# Patient Record
Sex: MALE | URBAN - METROPOLITAN AREA
[De-identification: names, ages, dates, MRNs, and addresses within clinical notes are randomized per-mention and may not be internally consistent; named-entity substitution may affect disease eponyms.]

---

## 2024-03-28 ENCOUNTER — OFFICE (OUTPATIENT)
Dept: URBAN - METROPOLITAN AREA CLINIC 86 | Facility: CLINIC | Age: 54
Setting detail: OPHTHALMOLOGY
End: 2024-03-28
Payer: COMMERCIAL

## 2024-03-28 DIAGNOSIS — H52.4: ICD-10-CM

## 2024-03-28 DIAGNOSIS — H25.13: ICD-10-CM

## 2024-03-28 PROCEDURE — 92015 DETERMINE REFRACTIVE STATE: CPT | Performed by: OPHTHALMOLOGY

## 2024-03-28 PROCEDURE — 92004 COMPRE OPH EXAM NEW PT 1/>: CPT | Performed by: OPHTHALMOLOGY

## 2024-03-28 ASSESSMENT — REFRACTION_MANIFEST
OD_AXIS: 020
OS_ADD: +1.50
OS_AXIS: 180
OS_CYLINDER: +1.00
OS_SPHERE: +1.25
OS_VA1: 20/20-
OD_CYLINDER: +0.50
OD_VA1: 20/20-
OD_SPHERE: +1.25
OD_ADD: +1.50

## 2024-03-28 ASSESSMENT — SPHEQUIV_DERIVED
OS_SPHEQUIV: 1.75
OD_SPHEQUIV: 1.5

## 2024-04-19 VITALS
TEMPERATURE: 98 F | HEART RATE: 80 BPM | SYSTOLIC BLOOD PRESSURE: 121 MMHG | DIASTOLIC BLOOD PRESSURE: 83 MMHG | WEIGHT: 202.38 LBS | HEIGHT: 68 IN | OXYGEN SATURATION: 96 % | RESPIRATION RATE: 16 BRPM

## 2024-04-19 RX ORDER — POVIDONE-IODINE 5 %
1 AEROSOL (ML) TOPICAL ONCE
Refills: 0 | Status: COMPLETED | OUTPATIENT
Start: 2024-04-22 | End: 2024-04-22

## 2024-04-19 NOTE — H&P ADULT - PROBLEM SELECTOR PLAN 1
Admit to Orthopaedic Service.  Presents today for elective laminectomy with posterolateral fusion with TLIF and pelvic fixation L3-S1  Pt medically stable and cleared for procedure today by Dr. Freitas

## 2024-04-19 NOTE — H&P ADULT - NSICDXPASTMEDICALHX_GEN_ALL_CORE_FT
PAST MEDICAL HISTORY:  HTN (hypertension)     AGUILA (obstructive sleep apnea)      PAST MEDICAL HISTORY:  Anxiety and depression     HTN (hypertension)     Snoring

## 2024-04-19 NOTE — H&P ADULT - NSHPPHYSICALEXAM_GEN_ALL_CORE
Gen: 54 y/o, NAD  MSK: Decreased lumbar spine ROM secondary to pain      Rest of PE per MD clearance Gen: 54 y/o, NAD  MSK: Decreased lumbar spine ROM secondary to pain  Skin without erythema, ecchymosis, abrasions or lesions, signs of infection  Calves soft, nontender bilaterally. Negative Jassi sign  Sensation intact to light touch left lower extremity. decrease sensation to right lower extremity when compared to left.  Pulses: DP 2+, brisk capillary refill  EHL/FHL/TA/GS 5/5 bilateral lower extremities      Rest of PE per MD clearance

## 2024-04-19 NOTE — H&P ADULT - HISTORY OF PRESENT ILLNESS
53yoM with low back pain x     Presents today for elective laminectomy with posteriolateral fusion with TLIF and pelvic fixation L3-S1 with Dr. Bagley 53yoM with low back pain greater than 1 year. Pt states that this was do to work injuries over the years, but denies a particular instance where he noticed a change. Symptoms have worsened over time and pt has failed conservative measures, including epidural injections. Pt states the pain is worse when walking up stairs and when he is doing chores around the home. The pain is limiting him from doing activities. Pt endorses use of Gasbepentin for pain but only takes it once in w while. he also uses Tylenol once in a while but states that it does not always work. pt admits to numbness to lateral upper thigh, but denies weakness ot tingling. Denies Hx of MI, stroke, seizures, CP, blood thinners, blood clots, n/v.    Presents today for elective laminectomy with posteriolateral fusion with TLIF and pelvic fixation L3-S1 with Dr. Bagley 53yoM with low back pain greater than 1 year. Pt states that this was do to work injuries over the years, but denies a particular instance where he noticed a change. Symptoms have worsened over time and pt has failed conservative measures, including epidural injections. Pt states the pain is worse when walking up stairs and when he is doing chores around the home. The pain is limiting him from doing activities. Pt endorses use of Gabapentin for pain but only takes it once in a while. He also uses Tylenol once in a while but states that it does not always work. pt admits to numbness to lateral upper thigh, but denies weakness ot tingling. Denies Hx of MI, stroke, seizures, CP, blood thinners, blood clots, n/v.    Presents today for elective laminectomy with posteriolateral fusion with TLIF and pelvic fixation L3-S1 with Dr. Bagley

## 2024-04-19 NOTE — PATIENT PROFILE ADULT - OVER THE PAST TWO WEEKS HAVE YOU FELT DOWN, DEPRESSED OR HOPELESS?
HR=87 bpm, YERM=705/71 mmhg, SpO2=99.0 %, Resp=10 B/min, EtCO2=35 mmHg, Pain=0, Shaw=2, Comment=sr no

## 2024-04-19 NOTE — PATIENT PROFILE ADULT - FALL HARM RISK - HARM RISK INTERVENTIONS

## 2024-04-19 NOTE — H&P ADULT - NSHPLABSRESULTS_GEN_ALL_CORE
Preop CBC, BMP, PT/INR, PTT within normal range and reviewed per medical clearance  H/H: 15.1/44.8  Cr: 1.1  UA: within normal limits and reviewed per medical clearance  Preop EKG within normal limits and reviewed per medical clearance  3M: DOS  T + S: DOS

## 2024-04-22 ENCOUNTER — INPATIENT (INPATIENT)
Facility: HOSPITAL | Age: 54
LOS: 3 days | Discharge: HOME | DRG: 455 | End: 2024-04-26
Attending: ORTHOPAEDIC SURGERY | Admitting: ORTHOPAEDIC SURGERY
Payer: COMMERCIAL

## 2024-04-22 DIAGNOSIS — I10 ESSENTIAL (PRIMARY) HYPERTENSION: ICD-10-CM

## 2024-04-22 DIAGNOSIS — Z98.890 OTHER SPECIFIED POSTPROCEDURAL STATES: Chronic | ICD-10-CM

## 2024-04-22 DIAGNOSIS — M48.061 SPINAL STENOSIS, LUMBAR REGION WITHOUT NEUROGENIC CLAUDICATION: ICD-10-CM

## 2024-04-22 DIAGNOSIS — G47.33 OBSTRUCTIVE SLEEP APNEA (ADULT) (PEDIATRIC): ICD-10-CM

## 2024-04-22 LAB
BLD GP AB SCN SERPL QL: NEGATIVE — SIGNIFICANT CHANGE UP
RH IG SCN BLD-IMP: POSITIVE — SIGNIFICANT CHANGE UP

## 2024-04-22 DEVICE — GRAFT BONE INFUSE KIT MED: Type: IMPLANTABLE DEVICE | Status: FUNCTIONAL

## 2024-04-22 DEVICE — SURGIFOAM PAD 8CM X 12.5CM X 10MM (100): Type: IMPLANTABLE DEVICE | Status: FUNCTIONAL

## 2024-04-22 DEVICE — CONN SFX TITAN MED SZ A6 CROSS: Type: IMPLANTABLE DEVICE | Status: FUNCTIONAL

## 2024-04-22 DEVICE — SURGIFLO HEMOSTATIC MATRIX KIT: Type: IMPLANTABLE DEVICE | Status: FUNCTIONAL

## 2024-04-22 DEVICE — ROD PRE-LORDOSED W/LINE 40MM: Type: IMPLANTABLE DEVICE | Status: FUNCTIONAL

## 2024-04-22 DEVICE — SCREW SET: Type: IMPLANTABLE DEVICE | Status: FUNCTIONAL

## 2024-04-22 DEVICE — IMPLANTABLE DEVICE: Type: IMPLANTABLE DEVICE | Status: FUNCTIONAL

## 2024-04-22 DEVICE — SCREW POLYAXIAL 6.0X50MM: Type: IMPLANTABLE DEVICE | Status: FUNCTIONAL

## 2024-04-22 RX ORDER — CHLORHEXIDINE GLUCONATE 213 G/1000ML
1 SOLUTION TOPICAL ONCE
Refills: 0 | Status: COMPLETED | OUTPATIENT
Start: 2024-04-22 | End: 2024-04-22

## 2024-04-22 RX ORDER — AMLODIPINE BESYLATE 2.5 MG/1
10 TABLET ORAL DAILY
Refills: 0 | Status: DISCONTINUED | OUTPATIENT
Start: 2024-04-22 | End: 2024-04-22

## 2024-04-22 RX ORDER — AMLODIPINE BESYLATE 2.5 MG/1
1 TABLET ORAL
Refills: 0 | DISCHARGE

## 2024-04-22 RX ORDER — APREPITANT 80 MG/1
40 CAPSULE ORAL ONCE
Refills: 0 | Status: COMPLETED | OUTPATIENT
Start: 2024-04-22 | End: 2024-04-22

## 2024-04-22 RX ORDER — OXYCODONE HYDROCHLORIDE 5 MG/1
5 TABLET ORAL EVERY 6 HOURS
Refills: 0 | Status: DISCONTINUED | OUTPATIENT
Start: 2024-04-22 | End: 2024-04-24

## 2024-04-22 RX ORDER — AMLODIPINE BESYLATE 2.5 MG/1
10 TABLET ORAL DAILY
Refills: 0 | Status: DISCONTINUED | OUTPATIENT
Start: 2024-04-22 | End: 2024-04-26

## 2024-04-22 RX ORDER — SODIUM CHLORIDE 9 MG/ML
1000 INJECTION, SOLUTION INTRAVENOUS
Refills: 0 | Status: DISCONTINUED | OUTPATIENT
Start: 2024-04-22 | End: 2024-04-23

## 2024-04-22 RX ORDER — SENNA PLUS 8.6 MG/1
2 TABLET ORAL AT BEDTIME
Refills: 0 | Status: DISCONTINUED | OUTPATIENT
Start: 2024-04-22 | End: 2024-04-26

## 2024-04-22 RX ORDER — ACETAMINOPHEN 500 MG
1000 TABLET ORAL EVERY 8 HOURS
Refills: 0 | Status: DISCONTINUED | OUTPATIENT
Start: 2024-04-22 | End: 2024-04-26

## 2024-04-22 RX ORDER — ACETAMINOPHEN 500 MG
1000 TABLET ORAL ONCE
Refills: 0 | Status: COMPLETED | OUTPATIENT
Start: 2024-04-22 | End: 2024-04-22

## 2024-04-22 RX ORDER — BUPROPION HYDROCHLORIDE 150 MG/1
300 TABLET, EXTENDED RELEASE ORAL DAILY
Refills: 0 | Status: DISCONTINUED | OUTPATIENT
Start: 2024-04-22 | End: 2024-04-26

## 2024-04-22 RX ORDER — HYDROMORPHONE HYDROCHLORIDE 2 MG/ML
0.5 INJECTION INTRAMUSCULAR; INTRAVENOUS; SUBCUTANEOUS
Refills: 0 | Status: DISCONTINUED | OUTPATIENT
Start: 2024-04-22 | End: 2024-04-26

## 2024-04-22 RX ORDER — PANTOPRAZOLE SODIUM 20 MG/1
40 TABLET, DELAYED RELEASE ORAL
Refills: 0 | Status: DISCONTINUED | OUTPATIENT
Start: 2024-04-22 | End: 2024-04-26

## 2024-04-22 RX ORDER — CEFAZOLIN SODIUM 1 G
2000 VIAL (EA) INJECTION EVERY 8 HOURS
Refills: 0 | Status: COMPLETED | OUTPATIENT
Start: 2024-04-22 | End: 2024-04-23

## 2024-04-22 RX ORDER — SODIUM CHLORIDE 9 MG/ML
1000 INJECTION, SOLUTION INTRAVENOUS ONCE
Refills: 0 | Status: COMPLETED | OUTPATIENT
Start: 2024-04-22 | End: 2024-04-22

## 2024-04-22 RX ORDER — ONDANSETRON 8 MG/1
4 TABLET, FILM COATED ORAL EVERY 4 HOURS
Refills: 0 | Status: DISCONTINUED | OUTPATIENT
Start: 2024-04-22 | End: 2024-04-26

## 2024-04-22 RX ORDER — POLYETHYLENE GLYCOL 3350 17 G/17G
17 POWDER, FOR SOLUTION ORAL
Refills: 0 | Status: DISCONTINUED | OUTPATIENT
Start: 2024-04-22 | End: 2024-04-26

## 2024-04-22 RX ORDER — HYDROMORPHONE HYDROCHLORIDE 2 MG/ML
0.5 INJECTION INTRAMUSCULAR; INTRAVENOUS; SUBCUTANEOUS EVERY 4 HOURS
Refills: 0 | Status: DISCONTINUED | OUTPATIENT
Start: 2024-04-22 | End: 2024-04-26

## 2024-04-22 RX ORDER — SODIUM CHLORIDE 9 MG/ML
1000 INJECTION, SOLUTION INTRAVENOUS
Refills: 0 | Status: DISCONTINUED | OUTPATIENT
Start: 2024-04-22 | End: 2024-04-22

## 2024-04-22 RX ADMIN — Medication 1000 MILLIGRAM(S): at 22:06

## 2024-04-22 RX ADMIN — Medication 1 APPLICATION(S): at 10:35

## 2024-04-22 RX ADMIN — Medication 1000 MILLIGRAM(S): at 22:51

## 2024-04-22 RX ADMIN — APREPITANT 40 MILLIGRAM(S): 80 CAPSULE ORAL at 10:30

## 2024-04-22 RX ADMIN — CHLORHEXIDINE GLUCONATE 1 APPLICATION(S): 213 SOLUTION TOPICAL at 10:35

## 2024-04-22 RX ADMIN — Medication 100 MILLIGRAM(S): at 19:19

## 2024-04-22 RX ADMIN — SENNA PLUS 2 TABLET(S): 8.6 TABLET ORAL at 22:07

## 2024-04-22 RX ADMIN — Medication 1000 MILLIGRAM(S): at 10:30

## 2024-04-22 RX ADMIN — SODIUM CHLORIDE 1000 MILLILITER(S): 9 INJECTION, SOLUTION INTRAVENOUS at 16:15

## 2024-04-22 NOTE — BRIEF OPERATIVE NOTE - NSICDXBRIEFPROCEDURE_GEN_ALL_CORE_FT
PROCEDURES:  Fusion, spine, lumbar, TLIF, 3 or more levels 22-Apr-2024 11:41:33  Mckinley Ryan   PROCEDURES:  Fusion, spine, lumbar, TLIF, 1-2 levels 22-Apr-2024 15:17:39  cMkinley Ryan

## 2024-04-22 NOTE — PROGRESS NOTE ADULT - SUBJECTIVE AND OBJECTIVE BOX
Ortho Post Op Check    Procedure:  Surgeon:    Pt comfortable without complaints, pain controlled  Denies CP, SOB, N/V, numbness/tingling     Vital Signs Last 24 Hrs  T(C): 36.6 (04-22-24 @ 18:26), Max: 36.6 (04-22-24 @ 18:26)  T(F): 97.8 (04-22-24 @ 18:26), Max: 97.8 (04-22-24 @ 18:26)  HR: 72 (04-22-24 @ 18:26) (62 - 72)  BP: 110/68 (04-22-24 @ 18:26) (93/51 - 110/68)  BP(mean): 77 (04-22-24 @ 17:30) (65 - 77)  RR: 18 (04-22-24 @ 18:26) (10 - 20)  SpO2: 96% (04-22-24 @ 18:26) (91% - 96%)  I&O's Summary    22 Apr 2024 07:01  -  22 Apr 2024 21:35  --------------------------------------------------------  IN: 1200 mL / OUT: 335 mL / NET: 865 mL        General: Pt Alert and oriented, NAD  DSG C/D/I paper tape, gauze, lang  RUE  Sensation: SILT C5-T1   Motor:   C5 (deltoid abduction) 5/5   C6 (biceps flexion)  5/5   C7 (triceps extension) 5/5   C8 (finger flexion)  5/5   T1 (interosseous)  5/5    LUE  Sensation: SILT C5-T1  Motor:   C5 (deltoid abduction) 5/5   C6 (biceps flexion)  5/5   C7 (triceps extension) 5/5   C8 (finger flexion)  5/5   T1 (interosseous)  5/5     RLE   Sensation: SILT L2  Motor: L3-L5  L2 (hip flexion)  4/5   L3 (knee extension)  4/5   L4 (ankle dorsiflexion)  5/5   L5 (long toe extension) 5/5   S1 (ankle plantar flexion) 5/5      LLE  Sensation: SILT L2-S1  Motor:   L2 (hip flexion)  5/5   L3 (knee extension)  5/5   L4 (ankle dorsiflexion)  5/5   L5 (long toe extension) 5/5   S1 (ankle plantar flexion) 5/5  Reflexes: Normal and symmetric  Negative clonus. Down-going Babinski        A/P: 53yMale POD#0 s/p L3/L4 TLIF  - Stable  - Pain Control  - DVT ppx: scds  - Post op abx: ancef  - PT, WBS:  wbat    Ortho Pager [phone]

## 2024-04-23 LAB
ANION GAP SERPL CALC-SCNC: 8 MMOL/L — SIGNIFICANT CHANGE UP (ref 5–17)
BASOPHILS # BLD AUTO: 0.03 K/UL — SIGNIFICANT CHANGE UP (ref 0–0.2)
BASOPHILS NFR BLD AUTO: 0.2 % — SIGNIFICANT CHANGE UP (ref 0–2)
BUN SERPL-MCNC: 11 MG/DL — SIGNIFICANT CHANGE UP (ref 7–23)
CALCIUM SERPL-MCNC: 9.1 MG/DL — SIGNIFICANT CHANGE UP (ref 8.4–10.5)
CHLORIDE SERPL-SCNC: 103 MMOL/L — SIGNIFICANT CHANGE UP (ref 96–108)
CO2 SERPL-SCNC: 25 MMOL/L — SIGNIFICANT CHANGE UP (ref 22–31)
CREAT SERPL-MCNC: 0.85 MG/DL — SIGNIFICANT CHANGE UP (ref 0.5–1.3)
EGFR: 105 ML/MIN/1.73M2 — SIGNIFICANT CHANGE UP
EOSINOPHIL # BLD AUTO: 0.01 K/UL — SIGNIFICANT CHANGE UP (ref 0–0.5)
EOSINOPHIL NFR BLD AUTO: 0.1 % — SIGNIFICANT CHANGE UP (ref 0–6)
GLUCOSE SERPL-MCNC: 156 MG/DL — HIGH (ref 70–99)
HCT VFR BLD CALC: 34.5 % — LOW (ref 39–50)
HGB BLD-MCNC: 11.2 G/DL — LOW (ref 13–17)
IMM GRANULOCYTES NFR BLD AUTO: 0.6 % — SIGNIFICANT CHANGE UP (ref 0–0.9)
LYMPHOCYTES # BLD AUTO: 0.99 K/UL — LOW (ref 1–3.3)
LYMPHOCYTES # BLD AUTO: 7.5 % — LOW (ref 13–44)
MCHC RBC-ENTMCNC: 29.1 PG — SIGNIFICANT CHANGE UP (ref 27–34)
MCHC RBC-ENTMCNC: 32.5 GM/DL — SIGNIFICANT CHANGE UP (ref 32–36)
MCV RBC AUTO: 89.6 FL — SIGNIFICANT CHANGE UP (ref 80–100)
MONOCYTES # BLD AUTO: 1.15 K/UL — HIGH (ref 0–0.9)
MONOCYTES NFR BLD AUTO: 8.7 % — SIGNIFICANT CHANGE UP (ref 2–14)
NEUTROPHILS # BLD AUTO: 10.97 K/UL — HIGH (ref 1.8–7.4)
NEUTROPHILS NFR BLD AUTO: 82.9 % — HIGH (ref 43–77)
NRBC # BLD: 0 /100 WBCS — SIGNIFICANT CHANGE UP (ref 0–0)
PLATELET # BLD AUTO: 241 K/UL — SIGNIFICANT CHANGE UP (ref 150–400)
POTASSIUM SERPL-MCNC: 4.1 MMOL/L — SIGNIFICANT CHANGE UP (ref 3.5–5.3)
POTASSIUM SERPL-SCNC: 4.1 MMOL/L — SIGNIFICANT CHANGE UP (ref 3.5–5.3)
RBC # BLD: 3.85 M/UL — LOW (ref 4.2–5.8)
RBC # FLD: 13.6 % — SIGNIFICANT CHANGE UP (ref 10.3–14.5)
SODIUM SERPL-SCNC: 136 MMOL/L — SIGNIFICANT CHANGE UP (ref 135–145)
WBC # BLD: 13.23 K/UL — HIGH (ref 3.8–10.5)
WBC # FLD AUTO: 13.23 K/UL — HIGH (ref 3.8–10.5)

## 2024-04-23 PROCEDURE — 99406 BEHAV CHNG SMOKING 3-10 MIN: CPT

## 2024-04-23 PROCEDURE — 99223 1ST HOSP IP/OBS HIGH 75: CPT

## 2024-04-23 RX ADMIN — POLYETHYLENE GLYCOL 3350 17 GRAM(S): 17 POWDER, FOR SOLUTION ORAL at 18:25

## 2024-04-23 RX ADMIN — Medication 1000 MILLIGRAM(S): at 13:38

## 2024-04-23 RX ADMIN — OXYCODONE HYDROCHLORIDE 5 MILLIGRAM(S): 5 TABLET ORAL at 21:28

## 2024-04-23 RX ADMIN — POLYETHYLENE GLYCOL 3350 17 GRAM(S): 17 POWDER, FOR SOLUTION ORAL at 06:30

## 2024-04-23 RX ADMIN — OXYCODONE HYDROCHLORIDE 5 MILLIGRAM(S): 5 TABLET ORAL at 13:38

## 2024-04-23 RX ADMIN — SODIUM CHLORIDE 100 MILLILITER(S): 9 INJECTION, SOLUTION INTRAVENOUS at 03:18

## 2024-04-23 RX ADMIN — OXYCODONE HYDROCHLORIDE 5 MILLIGRAM(S): 5 TABLET ORAL at 20:28

## 2024-04-23 RX ADMIN — BUPROPION HYDROCHLORIDE 300 MILLIGRAM(S): 150 TABLET, EXTENDED RELEASE ORAL at 11:09

## 2024-04-23 RX ADMIN — Medication 1000 MILLIGRAM(S): at 22:16

## 2024-04-23 RX ADMIN — OXYCODONE HYDROCHLORIDE 5 MILLIGRAM(S): 5 TABLET ORAL at 14:38

## 2024-04-23 RX ADMIN — Medication 1000 MILLIGRAM(S): at 06:30

## 2024-04-23 RX ADMIN — Medication 1000 MILLIGRAM(S): at 14:38

## 2024-04-23 RX ADMIN — PANTOPRAZOLE SODIUM 40 MILLIGRAM(S): 20 TABLET, DELAYED RELEASE ORAL at 06:31

## 2024-04-23 RX ADMIN — Medication 1000 MILLIGRAM(S): at 07:15

## 2024-04-23 RX ADMIN — Medication 100 MILLIGRAM(S): at 03:37

## 2024-04-23 RX ADMIN — Medication 50 MILLIGRAM(S): at 11:09

## 2024-04-23 NOTE — PHYSICAL THERAPY INITIAL EVALUATION ADULT - GENERAL OBSERVATIONS, REHAB EVAL
Patient received semi-supine in NAD with +ECG +heplock +hemovac x 2 +lang +spinal dressing clean/dry/intact

## 2024-04-23 NOTE — PROGRESS NOTE ADULT - SUBJECTIVE AND OBJECTIVE BOX
Ortho Note    Pt comfortable without complaints, pain controlled. Pre-op thigh numbness resolved.   Denies CP, SOB, N/V, numbness/tingling     Vital Signs Last 24 Hrs  T(C): --  T(F): --  HR: 64 (04-23-24 @ 05:35) (64 - 71)  BP: 106/64 (04-23-24 @ 05:35) (106/64 - 108/57)  BP(mean): 77 (04-23-24 @ 05:35) (76 - 77)  RR: 18 (04-23-24 @ 05:35) (16 - 18)  SpO2: 98% (04-23-24 @ 05:35) (96% - 98%)  I&O's Summary    22 Apr 2024 07:01  -  23 Apr 2024 06:39  --------------------------------------------------------  IN: 2300 mL / OUT: 1085 mL / NET: 1215 mL        General: Pt Alert and oriented, NAD  Gauze paper tape DSG C/D/I  HV x2  Pulses: 2+ DP/PT b/l  Sensation: SILT b/l, pre-op thigh numbness resolved  Motor: Quad/Ham/EHL/FHL/TA/GS  5/5          A/P: 53yMale POD#1 s/p L3-4 TLIF  - Stable  - Pain Control  - DVT ppx: SCDs  - PT, WBS: WBAT  - Dispo: PT Eval    Ortho Pager [phone]

## 2024-04-23 NOTE — DISCHARGE NOTE PROVIDER - NSDCMRMEDTOKEN_GEN_ALL_CORE_FT
amLODIPine 10 mg oral tablet: 1 tab(s) orally once a day  BuPROPion (Eqv-Zyban Advantage Pack) 150 mg/12 hours oral tablet, extended release: 1 tab(s) orally 2 times a day  gabapentin 300 mg oral capsule: 1 cap(s) orally prn   acetaminophen 500 mg oral tablet: 2 tab(s) orally every 8 hours  amLODIPine 10 mg oral tablet: 1 tab(s) orally once a day  buPROPion 300 mg/24 hours (XL) oral tablet, extended release: 1 tab(s) orally once a day  oxyCODONE 5 mg oral tablet: 1 tab(s) orally every 6 hours as needed for Moderate Pain (4 - 6) MDD: 4  pantoprazole 40 mg oral delayed release tablet: 1 tab(s) orally once a day (before a meal)  pregabalin 50 mg oral capsule: 1 cap(s) orally once a day MDD: 1  senna leaf extract oral tablet: 2 tab(s) orally once a day (at bedtime)

## 2024-04-23 NOTE — PHYSICAL THERAPY INITIAL EVALUATION ADULT - DIAGNOSIS, PT EVAL
4I: impaired joint mobility, motor function, muscle performance, and range of motion associated with bony or soft tissue surgery The patient has been re-examined and I agree with the above assessment or I updated with my findings.

## 2024-04-23 NOTE — PROGRESS NOTE ADULT - SUBJECTIVE AND OBJECTIVE BOX
Ortho Note    Surgery: s/p laminectomy, facetectomy, L3-4 TLIF POD #1    Patient seen and examined at bedside this AM   Pt comfortable without complaints, pain controlled  Denies CP, SOB, N/V, numbness/tingling     Vital Signs Last 24 Hrs  T(C): 36.7 (04-23-24 @ 09:06), Max: 36.7 (04-23-24 @ 09:06)  T(F): 98.1 (04-23-24 @ 09:06), Max: 98.1 (04-23-24 @ 09:06)  HR: 75 (04-23-24 @ 10:06) (75 - 80)  BP: 117/74 (04-23-24 @ 09:06) (117/74 - 117/74)  BP(mean): --  RR: 16 (04-23-24 @ 10:06) (13 - 16)  SpO2: 93% (04-23-24 @ 10:06) (93% - 93%)      General: Pt Alert and oriented, NAD  Dressing C/D/I: Abd and paper tape C/D/I at the lumbar spine  HV x2  Lang in place   Pulses: 2+ DP pulses bilateral LE   Sensation: intact to light touch bilateral LE   Motor: quad/ EHL/FHL/TA/GS 5/5 bilaterally         A/P: 53yMale POD#1 s/p laminectomy, facetectomy, L3-4 TLIF     - Stable: Labs and vitals stable. Patient on bipap o/n for untx AGUILA  - discontinue lang   - Pain Control: c/w PO and IV PRN   - DVT ppx: SCDs  - monitor drain outputs  - PT, WBS: WBAT  - Dispo: home pending progress with drains, PT     Ortho Pager [phone]

## 2024-04-23 NOTE — PHYSICAL THERAPY INITIAL EVALUATION ADULT - ADDITIONAL COMMENTS
Patient reports he lives in private home with 5 HELEN. PTA patient ambulated with 1 axillary crutch. Patient has tub shower

## 2024-04-23 NOTE — PHYSICAL THERAPY INITIAL EVALUATION ADULT - PERTINENT HX OF CURRENT PROBLEM, REHAB EVAL
Patient is a 53 year old male with low back pain greater than 1 year. Pt states that this was do to work injuries over the years, but denies a particular instance where he noticed a change. Symptoms have worsened over time and pt has failed conservative measures, including epidural injections. Pt states the pain is worse when walking up stairs and when he is doing chores around the home. The pain is limiting him from doing activities. Pt endorses use of Gabapentin for pain but only takes it once in a while. He also uses Tylenol once in a while but states that it does not always work. pt admits to numbness to lateral upper thigh, but denies weakness ot tingling.

## 2024-04-23 NOTE — CONSULT NOTE ADULT - SUBJECTIVE AND OBJECTIVE BOX
SURGERY CONSULT  ==============================================================================================================  HPI: 53y Male  HPI:  53yoM with low back pain greater than 1 year. Pt states that this was do to work injuries over the years, but denies a particular instance where he noticed a change. Symptoms have worsened over time and pt has failed conservative measures, including epidural injections. Pt states the pain is worse when walking up stairs and when he is doing chores around the home. The pain is limiting him from doing activities. Pt endorses use of Gabapentin for pain but only takes it once in a while. He also uses Tylenol once in a while but states that it does not always work. pt admits to numbness to lateral upper thigh, but denies weakness ot tingling. Denies Hx of MI, stroke, seizures, CP, blood thinners, blood clots, n/v.    Presents today for elective laminectomy with posteriolateral fusion with TLIF and pelvic fixation L3-S1 with Dr. Bagley (19 Apr 2024 12:04)    53M w HTN, Depression, obesity, chronic low back pain, Tobacco use d/o, here for elective L3-S1 TLIF and Postero-lateral fusion w Dr. To, found by anesthesia to have high likelihood for AGUILA, for HPT    Pt reports pain is controlled in his back. Denies any numbness. Walking wo LH/dizziness, chest pain, dyspnea. Eating wo N/V/abd pain. +flatus. Voiding no BM. No fevers.     ROS: 12 point ROS reviewed and otherwise negative per HPI  FH: No DVT/PE   SH: Smokes - 1ppd - last cigarette AM of surgery.     PAST MEDICAL & SURGICAL HISTORY:  HTN (hypertension)      Snoring      Anxiety and depression      History of hydrocelectomy      S/P right knee surgery        Home Meds: Home Medications:  amLODIPine 10 mg oral tablet: 1 tab(s) orally once a day (22 Apr 2024 10:08)  BuPROPion (Eqv-Zyban Advantage Pack) 150 mg/12 hours oral tablet, extended release: 1 tab(s) orally 2 times a day (22 Apr 2024 10:08)  gabapentin 300 mg oral capsule: 1 cap(s) orally prn (22 Apr 2024 10:08)    Allergies: Allergies    No Known Allergies    Intolerances      Soc:   Advanced Directives: Presumed Full Code     CURRENT MEDICATIONS:   --------------------------------------------------------------------------------------  Neurologic Medications  acetaminophen     Tablet .. 1000 milliGRAM(s) Oral every 8 hours  buPROPion XL (24-Hour) . 300 milliGRAM(s) Oral daily  HYDROmorphone  Injectable 0.5 milliGRAM(s) IV Push every 15 minutes PRN breakthrough pain in the PACU  HYDROmorphone  Injectable 0.5 milliGRAM(s) IV Push every 4 hours PRN breakthrough pain  ondansetron Injectable 4 milliGRAM(s) IV Push every 4 hours PRN Nausea and/or Vomiting  oxyCODONE    IR 5 milliGRAM(s) Oral every 6 hours PRN Moderate Pain (4 - 6)  pregabalin 50 milliGRAM(s) Oral daily    Respiratory Medications    Cardiovascular Medications  amLODIPine   Tablet 10 milliGRAM(s) Oral daily    Gastrointestinal Medications  pantoprazole    Tablet 40 milliGRAM(s) Oral before breakfast  polyethylene glycol 3350 17 Gram(s) Oral two times a day  senna 2 Tablet(s) Oral at bedtime    Genitourinary Medications    Hematologic/Oncologic Medications    Antimicrobial/Immunologic Medications    Endocrine/Metabolic Medications    Topical/Other Medications    --------------------------------------------------------------------------------------    VITAL SIGNS, INS/OUTS (last 24 hours):  --------------------------------------------------------------------------------------  ICU Vital Signs Last 24 Hrs  T(C): 36.7 (24 Apr 2024 04:52), Max: 37.1 (23 Apr 2024 13:27)  T(F): 98 (24 Apr 2024 04:52), Max: 98.7 (23 Apr 2024 13:27)  HR: 71 (24 Apr 2024 06:54) (71 - 100)  BP: 121/71 (24 Apr 2024 04:52) (114/74 - 125/76)  BP(mean): --  ABP: --  ABP(mean): --  RR: 18 (24 Apr 2024 06:54) (13 - 18)  SpO2: 92% (24 Apr 2024 06:54) (91% - 96%)    O2 Parameters below as of 24 Apr 2024 06:54  Patient On (Oxygen Delivery Method): room air          I&O's Summary    23 Apr 2024 07:01  -  24 Apr 2024 07:00  --------------------------------------------------------  IN: 1540 mL / OUT: 2440 mL / NET: -900 mL      --------------------------------------------------------------------------------------    EXAM:  GEN: Male in NAD on RA  HEENT: NC/AT, MMM  CV: RRR, nml S1S2, no murmurs  PULM: nml effort, CTAB  ABD: Soft, non-distended, NABS, non-tender  NEURO  A/O x3, moving all extremities, Sensation intact  Lumbar dressing c/d/i. 5/5 in BLE.  PSYCH: Appropriate      LABS  --------------------------------------------------------------------------------------  Labs:  CAPILLARY BLOOD GLUCOSE                              10.6   9.18  )-----------( 208      ( 24 Apr 2024 05:30 )             32.4         04-24    139  |  105  |  14  ----------------------------<  92  3.8   |  26  |  0.91      Calcium: 8.7 mg/dL (04-24-24 @ 05:30)      LFTs:         Coags:            Urinalysis Basic - ( 24 Apr 2024 05:30 )    Color: x / Appearance: x / SG: x / pH: x  Gluc: 92 mg/dL / Ketone: x  / Bili: x / Urobili: x   Blood: x / Protein: x / Nitrite: x   Leuk Esterase: x / RBC: x / WBC x   Sq Epi: x / Non Sq Epi: x / Bacteria: x          --------------------------------------------------------------------------------------    OTHER LABS    IMAGING RESULTS  ****************

## 2024-04-23 NOTE — DISCHARGE NOTE PROVIDER - NSDCFUADDINST_GEN_ALL_CORE_FT
ACTIVITY:  Weight bear as tolerated with assistive device. No strenuous activity (bending/twisting), heavy lifting, driving or returning to work until cleared by MD.    DRESSING:   You may shower, Do not soak in bathtubs. Change dressing daily with gauze/tape as needed until post-op day #5, then remove leave incision open to air.  Keep your incision clean and dry. Do not pick at your incision. Do not apply creams, ointments or oils to your incision until cleared by your surgeon. Do not soak your incision in sitting water (ie tubs, pools, lakes, etc.) until cleared by your surgeon. You may let clean, running water fall over your incision.    MEDICATION:  You have been prescribed medications for pain:  Tylenol for mild to moderate pain. Do not exceed 3,000mg daily.  For more severe pain, take Tylenol with the addition of narcotic pain medication. Take this medication as prescribed. This medication may cause drowsiness or dizziness. Do not operate machinery. This medication may cause constipation.  For any additional medications, follow instructions on the bottle.   Try to have regular bowel movements. Take stool softener or laxative if necessary. You may wish to take Miralax daily until you have regular bowel movements.   May take pepcid or zantac for upset stomach.  If you have a pain management physician, please follow-up with them postoperatively. If you experience any negative side effects of your medications, please call your surgeon's office to discuss.    Follow-up: Call to schedule an appt with Dr. To for follow up, if you have staples or sutures they will be removed in office. Contact your doctor if you experience: fever greater than 101.5, chills, chest pain, difficulty breathing, redness or excessive drainage around the incision.

## 2024-04-23 NOTE — DISCHARGE NOTE PROVIDER - CARE PROVIDER_API CALL
Humberto To  Orthopaedic Surgery  [address]  [place] [address]  Phone: [phone]  Fax: [phone]  Follow Up Time: 1 week

## 2024-04-23 NOTE — CONSULT NOTE ADULT - ASSESSMENT
53M w HTN, Depression, obesity, chronic low back pain, Tobacco use d/o, here for elective L3-S1 TLIF and Postero-lateral fusion w Dr. To, found by anesthesia to have high likelihood for AGUILA, for HPT    #Post-op state - pain controlled. PPx: SCDs. On bowel regimen and incentive spirometer  #Lumbar stenosis    - Tylenol 1g q8, lyrica 50 daily   - PRN: Oxycodone 5 q6 for mod pain.     #Acute blood loss anemia - hgb 11.2. Baseline 15. Asymptomatic  #Leukocytosis - WBC 13. Afebrile and non-toxic. likely reactive    #HTN - home on amlodipine 10  #Depression - home on buproprion 150 ER BID  #Obesity - BMI 30.8. Affects all aspects of care  #AGUILA - suspected by anesthesia perioperatively. Started on BiPAP HS   - Pt's STOPBANG indicates HIGH likelihood for AGUILA. Pt was told by PCP this as well and notes plan was to get referred to sleep study after surgery   - c/w BiPAP HS  #Tobacco use d/o - motivated to quit    Plan  Continue PT  BiPAP HS  Pt will need outpatient follow-up w PCP and schedule sleep study.    Above d/w Ortho

## 2024-04-23 NOTE — DISCHARGE NOTE PROVIDER - HOSPITAL COURSE
Admit to Orthopedics   OR for L3-S1 laminectomy and posterolateral fusion with TLIF, pelvic fixation   Periop Antibiotics- Ancef for 24 hours   DVT ppx- SCDs   PT, OT, WBAT   Pain mgt Admit to Orthopedics   OR for L3-S1 laminectomy and posterolateral fusion with TLIF, pelvic fixation   Periop Antibiotics- Ancef for 24 hours   Drains: hemovac x2, removed on ______ when output appropriately low   DVT ppx- SCDs   PT, OT, WBAT   Pain mgt Admit to Orthopedics   OR for L3-S1 laminectomy and posterolateral fusion with TLIF, pelvic fixation   Periop Antibiotics- Ancef for 24 hours   Drains: hemovac x2, removed on _4-26_____ when output appropriately low   DVT ppx- SCDs   PT, OT, WBAT   Pain mgt

## 2024-04-24 LAB
ANION GAP SERPL CALC-SCNC: 8 MMOL/L — SIGNIFICANT CHANGE UP (ref 5–17)
BUN SERPL-MCNC: 14 MG/DL — SIGNIFICANT CHANGE UP (ref 7–23)
CALCIUM SERPL-MCNC: 8.7 MG/DL — SIGNIFICANT CHANGE UP (ref 8.4–10.5)
CHLORIDE SERPL-SCNC: 105 MMOL/L — SIGNIFICANT CHANGE UP (ref 96–108)
CO2 SERPL-SCNC: 26 MMOL/L — SIGNIFICANT CHANGE UP (ref 22–31)
CREAT SERPL-MCNC: 0.91 MG/DL — SIGNIFICANT CHANGE UP (ref 0.5–1.3)
EGFR: 102 ML/MIN/1.73M2 — SIGNIFICANT CHANGE UP
GLUCOSE SERPL-MCNC: 92 MG/DL — SIGNIFICANT CHANGE UP (ref 70–99)
HCT VFR BLD CALC: 32.4 % — LOW (ref 39–50)
HGB BLD-MCNC: 10.6 G/DL — LOW (ref 13–17)
MCHC RBC-ENTMCNC: 29.4 PG — SIGNIFICANT CHANGE UP (ref 27–34)
MCHC RBC-ENTMCNC: 32.7 GM/DL — SIGNIFICANT CHANGE UP (ref 32–36)
MCV RBC AUTO: 89.8 FL — SIGNIFICANT CHANGE UP (ref 80–100)
NRBC # BLD: 0 /100 WBCS — SIGNIFICANT CHANGE UP (ref 0–0)
PLATELET # BLD AUTO: 208 K/UL — SIGNIFICANT CHANGE UP (ref 150–400)
POTASSIUM SERPL-MCNC: 3.8 MMOL/L — SIGNIFICANT CHANGE UP (ref 3.5–5.3)
POTASSIUM SERPL-SCNC: 3.8 MMOL/L — SIGNIFICANT CHANGE UP (ref 3.5–5.3)
RBC # BLD: 3.61 M/UL — LOW (ref 4.2–5.8)
RBC # FLD: 13.9 % — SIGNIFICANT CHANGE UP (ref 10.3–14.5)
SODIUM SERPL-SCNC: 139 MMOL/L — SIGNIFICANT CHANGE UP (ref 135–145)
WBC # BLD: 9.18 K/UL — SIGNIFICANT CHANGE UP (ref 3.8–10.5)
WBC # FLD AUTO: 9.18 K/UL — SIGNIFICANT CHANGE UP (ref 3.8–10.5)

## 2024-04-24 PROCEDURE — 99233 SBSQ HOSP IP/OBS HIGH 50: CPT

## 2024-04-24 RX ORDER — OXYCODONE HYDROCHLORIDE 5 MG/1
10 TABLET ORAL EVERY 4 HOURS
Refills: 0 | Status: DISCONTINUED | OUTPATIENT
Start: 2024-04-24 | End: 2024-04-26

## 2024-04-24 RX ORDER — OXYCODONE HYDROCHLORIDE 5 MG/1
5 TABLET ORAL EVERY 4 HOURS
Refills: 0 | Status: DISCONTINUED | OUTPATIENT
Start: 2024-04-24 | End: 2024-04-26

## 2024-04-24 RX ADMIN — BUPROPION HYDROCHLORIDE 300 MILLIGRAM(S): 150 TABLET, EXTENDED RELEASE ORAL at 11:29

## 2024-04-24 RX ADMIN — PANTOPRAZOLE SODIUM 40 MILLIGRAM(S): 20 TABLET, DELAYED RELEASE ORAL at 05:05

## 2024-04-24 RX ADMIN — Medication 50 MILLIGRAM(S): at 11:29

## 2024-04-24 RX ADMIN — Medication 1000 MILLIGRAM(S): at 21:01

## 2024-04-24 RX ADMIN — OXYCODONE HYDROCHLORIDE 10 MILLIGRAM(S): 5 TABLET ORAL at 19:48

## 2024-04-24 RX ADMIN — OXYCODONE HYDROCHLORIDE 10 MILLIGRAM(S): 5 TABLET ORAL at 18:48

## 2024-04-24 RX ADMIN — AMLODIPINE BESYLATE 10 MILLIGRAM(S): 2.5 TABLET ORAL at 05:05

## 2024-04-24 RX ADMIN — Medication 1000 MILLIGRAM(S): at 05:05

## 2024-04-24 RX ADMIN — POLYETHYLENE GLYCOL 3350 17 GRAM(S): 17 POWDER, FOR SOLUTION ORAL at 18:48

## 2024-04-24 RX ADMIN — OXYCODONE HYDROCHLORIDE 5 MILLIGRAM(S): 5 TABLET ORAL at 12:29

## 2024-04-24 RX ADMIN — OXYCODONE HYDROCHLORIDE 5 MILLIGRAM(S): 5 TABLET ORAL at 11:29

## 2024-04-24 RX ADMIN — Medication 1000 MILLIGRAM(S): at 22:00

## 2024-04-24 RX ADMIN — SENNA PLUS 2 TABLET(S): 8.6 TABLET ORAL at 21:01

## 2024-04-24 RX ADMIN — Medication 1000 MILLIGRAM(S): at 13:49

## 2024-04-24 NOTE — PROGRESS NOTE ADULT - SUBJECTIVE AND OBJECTIVE BOX
INTERVAL HPI/OVERNIGHT EVENTS: mery o/n    SUBJECTIVE: Patient seen and examined at bedside.   Pt eager to mobilize w PT. states pain in back is controlled. No LH/dizziness, chest pain, dyspnea. Eating wo N/V/abd pain. Voiding. +flatus wo BM. No fevers.     OBJECTIVE:    VITAL SIGNS:  ICU Vital Signs Last 24 Hrs  T(C): 36.7 (24 Apr 2024 08:44), Max: 37 (24 Apr 2024 00:00)  T(F): 98.1 (24 Apr 2024 08:44), Max: 98.6 (24 Apr 2024 00:00)  HR: 83 (24 Apr 2024 11:35) (71 - 83)  BP: 123/64 (24 Apr 2024 08:44) (114/74 - 123/64)  BP(mean): --  ABP: --  ABP(mean): --  RR: 17 (24 Apr 2024 11:35) (13 - 18)  SpO2: 95% (24 Apr 2024 11:35) (91% - 96%)    O2 Parameters below as of 24 Apr 2024 11:35  Patient On (Oxygen Delivery Method): room air              04-23 @ 07:01 - 04-24 @ 07:00  --------------------------------------------------------  IN: 1540 mL / OUT: 2440 mL / NET: -900 mL    04-24 @ 07:01 - 04-24 @ 13:55  --------------------------------------------------------  IN: 460 mL / OUT: 600 mL / NET: -140 mL      CAPILLARY BLOOD GLUCOSE          PHYSICAL EXAM:  GEN: Male in NAD on RA  HEENT: NC/AT, MMM  CV: RRR, nml S1S2, no murmurs  PULM: nml effort, CTAB  ABD: Soft, non-distended, NABS, non-tender  NEURO  A/O x3, moving all extremities, Sensation intact  Lumbar dressing c/d/i. 5/5 in BLE.  PSYCH: Appropriate    MEDICATIONS:  MEDICATIONS  (STANDING):  acetaminophen     Tablet .. 1000 milliGRAM(s) Oral every 8 hours  amLODIPine   Tablet 10 milliGRAM(s) Oral daily  buPROPion XL (24-Hour) . 300 milliGRAM(s) Oral daily  pantoprazole    Tablet 40 milliGRAM(s) Oral before breakfast  polyethylene glycol 3350 17 Gram(s) Oral two times a day  pregabalin 50 milliGRAM(s) Oral daily  senna 2 Tablet(s) Oral at bedtime    MEDICATIONS  (PRN):  HYDROmorphone  Injectable 0.5 milliGRAM(s) IV Push every 15 minutes PRN breakthrough pain in the PACU  HYDROmorphone  Injectable 0.5 milliGRAM(s) IV Push every 4 hours PRN breakthrough pain  ondansetron Injectable 4 milliGRAM(s) IV Push every 4 hours PRN Nausea and/or Vomiting  oxyCODONE    IR 10 milliGRAM(s) Oral every 4 hours PRN Severe Pain (7 - 10)  oxyCODONE    IR 5 milliGRAM(s) Oral every 4 hours PRN Moderate Pain (4 - 6)      ALLERGIES:  Allergies    No Known Allergies    Intolerances        LABS:                        10.6   9.18  )-----------( 208      ( 24 Apr 2024 05:30 )             32.4     04-24    139  |  105  |  14  ----------------------------<  92  3.8   |  26  |  0.91    Ca    8.7      24 Apr 2024 05:30        Urinalysis Basic - ( 24 Apr 2024 05:30 )    Color: x / Appearance: x / SG: x / pH: x  Gluc: 92 mg/dL / Ketone: x  / Bili: x / Urobili: x   Blood: x / Protein: x / Nitrite: x   Leuk Esterase: x / RBC: x / WBC x   Sq Epi: x / Non Sq Epi: x / Bacteria: x        RADIOLOGY & ADDITIONAL TESTS: Reviewed.

## 2024-04-24 NOTE — PROGRESS NOTE ADULT - SUBJECTIVE AND OBJECTIVE BOX
Ortho Note    Pt comfortable without complaints, pain controlled, ambulated through unit yesterday  Denies CP, SOB, N/V    Vital Signs Last 24 Hrs  T(C): 36.7 (04-24-24 @ 04:52), Max: 36.7 (04-24-24 @ 04:52)  T(F): 98 (04-24-24 @ 04:52), Max: 98 (04-24-24 @ 04:52)  HR: 72 (04-24-24 @ 04:52) (72 - 72)  BP: 121/71 (04-24-24 @ 04:52) (121/71 - 121/71)  BP(mean): --  RR: 18 (04-24-24 @ 04:52) (18 - 18)  SpO2: 96% (04-24-24 @ 04:52) (96% - 96%)  I&O's Summary    22 Apr 2024 07:01  -  23 Apr 2024 07:00  --------------------------------------------------------  IN: 2300 mL / OUT: 1250 mL / NET: 1050 mL    23 Apr 2024 07:01  -  24 Apr 2024 06:03  --------------------------------------------------------  IN: 1540 mL / OUT: 2440 mL / NET: -900 mL        General: Pt Alert and oriented, NAD  Gauze paper tape DSG C/D/I  HV x2  Pulses: 2+ DP/PT b/l  Sensation: SILT b/l, pre-op thigh numbness recurring but still less than pre-op level  Motor: Quad/Ham/EHL/FHL/TA/GS  5/5                          11.2   13.23 )-----------( 241      ( 23 Apr 2024 05:30 )             34.5     04-23    136  |  103  |  11  ----------------------------<  156<H>  4.1   |  25  |  0.85    Ca    9.1      23 Apr 2024 05:30        A/P: 53yMale POD#2 s/p L3-4 TLIF  - Stable  - Pain Control  - DVT ppx: SCDs  - PT, WBS: WBAT  - f/u drain output  - Dispo: PT     Ortho Pager [phone]

## 2024-04-24 NOTE — PROGRESS NOTE ADULT - SUBJECTIVE AND OBJECTIVE BOX
POST OPERATIVE DAY #:   STATUS POST: Left    Right  TKA/THR                        SUBJECTIVE: Patient seen and examined.   Denies any sob/cp/n/v/numbness or tingling in b/l     OBJECTIVE:     Vital Signs Last 24 Hrs  T(C): 36.7 (24 Apr 2024 08:44), Max: 37.1 (23 Apr 2024 13:27)  T(F): 98.1 (24 Apr 2024 08:44), Max: 98.7 (23 Apr 2024 13:27)  HR: 75 (24 Apr 2024 08:44) (71 - 100)  BP: 123/64 (24 Apr 2024 08:44) (114/74 - 125/76)  BP(mean): --  RR: 18 (24 Apr 2024 08:44) (13 - 18)  SpO2: 92% (24 Apr 2024 08:44) (91% - 96%)    Parameters below as of 24 Apr 2024 08:44  Patient On (Oxygen Delivery Method): room air        General:  Affected extremity:   Dressing: clean/dry/intact   Sensation: intact to light touch to patient's baseline  Motor exam: EHL/TA/GS    Pulses             I&O's Detail    23 Apr 2024 07:01  -  24 Apr 2024 07:00  --------------------------------------------------------  IN:    Lactated Ringers: 700 mL    Oral Fluid: 840 mL  Total IN: 1540 mL    OUT:    Accordian (mL): 95 mL    Accordian (mL): 220 mL    Indwelling Catheter - Urethral (mL): 850 mL    Voided (mL): 1275 mL  Total OUT: 2440 mL    Total NET: -900 mL      24 Apr 2024 07:01  -  24 Apr 2024 12:12  --------------------------------------------------------  IN:    Oral Fluid: 180 mL  Total IN: 180 mL    OUT:    Accordian (mL): 50 mL    Accordian (mL): 0 mL    Voided (mL): 550 mL  Total OUT: 600 mL    Total NET: -420 mL          LABS:                        10.6   9.18  )-----------( 208      ( 24 Apr 2024 05:30 )             32.4     04-24    139  |  105  |  14  ----------------------------<  92  3.8   |  26  |  0.91    Ca    8.7      24 Apr 2024 05:30        Urinalysis Basic - ( 24 Apr 2024 05:30 )    Color: x / Appearance: x / SG: x / pH: x  Gluc: 92 mg/dL / Ketone: x  / Bili: x / Urobili: x   Blood: x / Protein: x / Nitrite: x   Leuk Esterase: x / RBC: x / WBC x   Sq Epi: x / Non Sq Epi: x / Bacteria: x        MEDICATIONS:    acetaminophen     Tablet .. 1000 milliGRAM(s) Oral every 8 hours  buPROPion XL (24-Hour) . 300 milliGRAM(s) Oral daily  HYDROmorphone  Injectable 0.5 milliGRAM(s) IV Push every 15 minutes PRN  HYDROmorphone  Injectable 0.5 milliGRAM(s) IV Push every 4 hours PRN  ondansetron Injectable 4 milliGRAM(s) IV Push every 4 hours PRN  oxyCODONE    IR 5 milliGRAM(s) Oral every 6 hours PRN  pregabalin 50 milliGRAM(s) Oral daily          ASSESSMENT AND PLAN: 52yo Male s/p     1. Analgesic pain control  2. DVT prophylaxis: ASA      HSQ       Coumadin      Lovenox      SCDs      Other:   3. Weight Bearing Status:  Weight bearing as tolerated       NWB         Partial Weight Bearing  4. Disposition: Home          Subacute Rehab      pending PT evaluation POST OPERATIVE DAY #: 2  STATUS POST: Laminectomy, facetectomy, L3-L4 TLIF                      SUBJECTIVE: Patient seen and examined. Patient has been experiencing numbness to his right ankle and knee following the procedure. He says he had numbness in his right thigh prior to the surgery, but the numbness has since migrated to his right ankle and below his knee following PT yesterday. He denies any subsequent weakness or paralysis to his right leg with the numbness. Denies difficulty with passing bowel or urinating. Denies any sob/cp/n/v/.     OBJECTIVE: Patient is comfortable and pain appears to be well-controlled.      Vital Signs Last 24 Hrs  T(C): 36.7 (24 Apr 2024 08:44), Max: 37.1 (23 Apr 2024 13:27)  T(F): 98.1 (24 Apr 2024 08:44), Max: 98.7 (23 Apr 2024 13:27)  HR: 75 (24 Apr 2024 08:44) (71 - 100)  BP: 123/64 (24 Apr 2024 08:44) (114/74 - 125/76)  BP(mean): --  RR: 18 (24 Apr 2024 08:44) (13 - 18)  SpO2: 92% (24 Apr 2024 08:44) (91% - 96%)    Parameters below as of 24 Apr 2024 08:44  Patient On (Oxygen Delivery Method): room air        General: NAD and well-appearing  Affected extremity: non-erythematous and non-ecchymotic. Compartments are soft and compressible.   Dressing: clean/dry/intact   Sensation: diminished sensations along L4 dermatome below the right knee and L5 dermatome at the right ankle. Sensations intact in the left leg.   Motor exam: EHL/TA/GS + with 5/5 muscle strength b/l   Pulses 2+ b/l             I&O's Detail    23 Apr 2024 07:01  -  24 Apr 2024 07:00  --------------------------------------------------------  IN:    Lactated Ringers: 700 mL    Oral Fluid: 840 mL  Total IN: 1540 mL    OUT:    Accordian (mL): 95 mL    Accordian (mL): 220 mL    Indwelling Catheter - Urethral (mL): 850 mL    Voided (mL): 1275 mL  Total OUT: 2440 mL    Total NET: -900 mL      24 Apr 2024 07:01  -  24 Apr 2024 12:12  --------------------------------------------------------  IN:    Oral Fluid: 180 mL  Total IN: 180 mL    OUT:    Accordian (mL): 50 mL    Accordian (mL): 0 mL    Voided (mL): 550 mL  Total OUT: 600 mL    Total NET: -420 mL          LABS:                        10.6   9.18  )-----------( 208      ( 24 Apr 2024 05:30 )             32.4     04-24    139  |  105  |  14  ----------------------------<  92  3.8   |  26  |  0.91    Ca    8.7      24 Apr 2024 05:30        Urinalysis Basic - ( 24 Apr 2024 05:30 )    Color: x / Appearance: x / SG: x / pH: x  Gluc: 92 mg/dL / Ketone: x  / Bili: x / Urobili: x   Blood: x / Protein: x / Nitrite: x   Leuk Esterase: x / RBC: x / WBC x   Sq Epi: x / Non Sq Epi: x / Bacteria: x        MEDICATIONS:    acetaminophen     Tablet .. 1000 milliGRAM(s) Oral every 8 hours  buPROPion XL (24-Hour) . 300 milliGRAM(s) Oral daily  HYDROmorphone  Injectable 0.5 milliGRAM(s) IV Push every 15 minutes PRN  HYDROmorphone  Injectable 0.5 milliGRAM(s) IV Push every 4 hours PRN  ondansetron Injectable 4 milliGRAM(s) IV Push every 4 hours PRN  oxyCODONE    IR 5 milliGRAM(s) Oral every 6 hours PRN  pregabalin 50 milliGRAM(s) Oral daily          ASSESSMENT AND PLAN: 52yo Male s/p laminectomy, facetectomy, L3-L4 TLIF    1. Analgesic pain control  2. DVT prophylaxis: SCDs  3. Weight Bearing Status: Weight bearing as tolerated  4. Disposition: Home POST OPERATIVE DAY #: 2  STATUS POST: Laminectomy, facetectomy, L3-L4 TLIF                      SUBJECTIVE: Patient seen and examined. Patient has been experiencing numbness to his right ankle and knee following the procedure. He says he had numbness in his right thigh prior to the surgery, but the numbness has since migrated to his right ankle and below his knee following PT yesterday. He denies any subsequent weakness or paralysis to his right leg with the numbness. Denies difficulty with passing bowel or urinating. Denies any sob/cp/n/v/.     OBJECTIVE: Patient is comfortable and pain appears to be well-controlled.      Vital Signs Last 24 Hrs  T(C): 36.7 (24 Apr 2024 08:44), Max: 37.1 (23 Apr 2024 13:27)  T(F): 98.1 (24 Apr 2024 08:44), Max: 98.7 (23 Apr 2024 13:27)  HR: 75 (24 Apr 2024 08:44) (71 - 100)  BP: 123/64 (24 Apr 2024 08:44) (114/74 - 125/76)  BP(mean): --  RR: 18 (24 Apr 2024 08:44) (13 - 18)  SpO2: 92% (24 Apr 2024 08:44) (91% - 96%)    Parameters below as of 24 Apr 2024 08:44  Patient On (Oxygen Delivery Method): room air        General: NAD and well-appearing  Affected extremity: bilateral les non-erythematous and non-ecchymotic. Compartments are soft and compressible.   Dressing: clean/dry/intact with 2 drains  Sensation: diminished sensations along L4 dermatome below the right knee and S1 dermatome at the right ankle. Sensations intact in the left leg.   Motor exam: EHL/TA/GS + with 5/5 muscle strength b/l   Pulses 2+ b/l             I&O's Detail    23 Apr 2024 07:01  -  24 Apr 2024 07:00  --------------------------------------------------------  IN:    Lactated Ringers: 700 mL    Oral Fluid: 840 mL  Total IN: 1540 mL    OUT:    Accordian (mL): 95 mL    Accordian (mL): 220 mL    Indwelling Catheter - Urethral (mL): 850 mL    Voided (mL): 1275 mL  Total OUT: 2440 mL    Total NET: -900 mL      24 Apr 2024 07:01  -  24 Apr 2024 12:12  --------------------------------------------------------  IN:    Oral Fluid: 180 mL  Total IN: 180 mL    OUT:    Accordian (mL): 50 mL    Accordian (mL): 0 mL    Voided (mL): 550 mL  Total OUT: 600 mL    Total NET: -420 mL          LABS:                        10.6   9.18  )-----------( 208      ( 24 Apr 2024 05:30 )             32.4     04-24    139  |  105  |  14  ----------------------------<  92  3.8   |  26  |  0.91    Ca    8.7      24 Apr 2024 05:30        Urinalysis Basic - ( 24 Apr 2024 05:30 )    Color: x / Appearance: x / SG: x / pH: x  Gluc: 92 mg/dL / Ketone: x  / Bili: x / Urobili: x   Blood: x / Protein: x / Nitrite: x   Leuk Esterase: x / RBC: x / WBC x   Sq Epi: x / Non Sq Epi: x / Bacteria: x        MEDICATIONS:    acetaminophen     Tablet .. 1000 milliGRAM(s) Oral every 8 hours  buPROPion XL (24-Hour) . 300 milliGRAM(s) Oral daily  HYDROmorphone  Injectable 0.5 milliGRAM(s) IV Push every 15 minutes PRN  HYDROmorphone  Injectable 0.5 milliGRAM(s) IV Push every 4 hours PRN  ondansetron Injectable 4 milliGRAM(s) IV Push every 4 hours PRN  oxyCODONE    IR 5 milliGRAM(s) Oral every 6 hours PRN  pregabalin 50 milliGRAM(s) Oral daily          ASSESSMENT AND PLAN: 54yo Male s/p laminectomy, facetectomy, L3-L4 TLIF    1. Analgesic pain control  2. DVT prophylaxis: SCDs  3. Weight Bearing Status: Weight bearing as tolerated  4. Disposition: Home pending PT clearance   5. Drains- will keep in today   6. AGUILA on cpap  7. HTN- Continue home medication   8. Depression- Continue home medication  POST OPERATIVE DAY #: 2  STATUS POST: Laminectomy, facetectomy, L3-L4 TLIF                      SUBJECTIVE: Patient seen and examined. Patient has been experiencing numbness to his right ankle and knee following the procedure. He says he had numbness in his right thigh prior to the surgery, but the numbness has since migrated to his right ankle and below his knee following PT yesterday. He denies any subsequent weakness or paralysis to his right leg with the numbness. Denies difficulty with passing bowel or urinating. Denies any sob/cp/n/v/.     OBJECTIVE: Patient is comfortable and pain appears to be well-controlled.      Vital Signs Last 24 Hrs  T(C): 36.7 (24 Apr 2024 08:44), Max: 37.1 (23 Apr 2024 13:27)  T(F): 98.1 (24 Apr 2024 08:44), Max: 98.7 (23 Apr 2024 13:27)  HR: 75 (24 Apr 2024 08:44) (71 - 100)  BP: 123/64 (24 Apr 2024 08:44) (114/74 - 125/76)  BP(mean): --  RR: 18 (24 Apr 2024 08:44) (13 - 18)  SpO2: 92% (24 Apr 2024 08:44) (91% - 96%)    Parameters below as of 24 Apr 2024 08:44  Patient On (Oxygen Delivery Method): room air        General: NAD and well-appearing  Affected extremity: bilateral les non-erythematous and non-ecchymotic. Compartments are soft and compressible.   Dressing: clean/dry/intact with 2 drains  Sensation: diminished sensations along L4 dermatome below the right knee and S1 dermatome at the right ankle. Sensations intact in the left leg.   Motor exam: EHL/TA/GS + with 5/5 muscle strength b/l   Pulses 2+ b/l             I&O's Detail    23 Apr 2024 07:01  -  24 Apr 2024 07:00  --------------------------------------------------------  IN:    Lactated Ringers: 700 mL    Oral Fluid: 840 mL  Total IN: 1540 mL    OUT:    Accordian (mL): 95 mL    Accordian (mL): 220 mL    Indwelling Catheter - Urethral (mL): 850 mL    Voided (mL): 1275 mL  Total OUT: 2440 mL    Total NET: -900 mL      24 Apr 2024 07:01  -  24 Apr 2024 12:12  --------------------------------------------------------  IN:    Oral Fluid: 180 mL  Total IN: 180 mL    OUT:    Accordian (mL): 50 mL    Accordian (mL): 0 mL    Voided (mL): 550 mL  Total OUT: 600 mL    Total NET: -420 mL          LABS:                        10.6   9.18  )-----------( 208      ( 24 Apr 2024 05:30 )             32.4     04-24    139  |  105  |  14  ----------------------------<  92  3.8   |  26  |  0.91    Ca    8.7      24 Apr 2024 05:30        Urinalysis Basic - ( 24 Apr 2024 05:30 )    Color: x / Appearance: x / SG: x / pH: x  Gluc: 92 mg/dL / Ketone: x  / Bili: x / Urobili: x   Blood: x / Protein: x / Nitrite: x   Leuk Esterase: x / RBC: x / WBC x   Sq Epi: x / Non Sq Epi: x / Bacteria: x        MEDICATIONS:    acetaminophen     Tablet .. 1000 milliGRAM(s) Oral every 8 hours  buPROPion XL (24-Hour) . 300 milliGRAM(s) Oral daily  HYDROmorphone  Injectable 0.5 milliGRAM(s) IV Push every 15 minutes PRN  HYDROmorphone  Injectable 0.5 milliGRAM(s) IV Push every 4 hours PRN  ondansetron Injectable 4 milliGRAM(s) IV Push every 4 hours PRN  oxyCODONE    IR 5 milliGRAM(s) Oral every 6 hours PRN  pregabalin 50 milliGRAM(s) Oral daily          ASSESSMENT AND PLAN: 54yo Male s/p laminectomy, facetectomy, L3-L4 TLIF    1. Analgesic pain control  2. DVT prophylaxis: SCDs  3. Weight Bearing Status: Weight bearing as tolerated  4. Disposition: Home pending PT clearance, ordered OT consult   5. Drains- will keep in today   6. AGUILA on cpap  7. HTN- Continue home medication   8. Depression- Continue home medication

## 2024-04-25 LAB
ANION GAP SERPL CALC-SCNC: 6 MMOL/L — SIGNIFICANT CHANGE UP (ref 5–17)
BUN SERPL-MCNC: 13 MG/DL — SIGNIFICANT CHANGE UP (ref 7–23)
CALCIUM SERPL-MCNC: 9.5 MG/DL — SIGNIFICANT CHANGE UP (ref 8.4–10.5)
CHLORIDE SERPL-SCNC: 101 MMOL/L — SIGNIFICANT CHANGE UP (ref 96–108)
CO2 SERPL-SCNC: 29 MMOL/L — SIGNIFICANT CHANGE UP (ref 22–31)
CREAT SERPL-MCNC: 0.97 MG/DL — SIGNIFICANT CHANGE UP (ref 0.5–1.3)
EGFR: 95 ML/MIN/1.73M2 — SIGNIFICANT CHANGE UP
GLUCOSE SERPL-MCNC: 99 MG/DL — SIGNIFICANT CHANGE UP (ref 70–99)
HCT VFR BLD CALC: 34.9 % — LOW (ref 39–50)
HGB BLD-MCNC: 11.2 G/DL — LOW (ref 13–17)
MCHC RBC-ENTMCNC: 29.3 PG — SIGNIFICANT CHANGE UP (ref 27–34)
MCHC RBC-ENTMCNC: 32.1 GM/DL — SIGNIFICANT CHANGE UP (ref 32–36)
MCV RBC AUTO: 91.4 FL — SIGNIFICANT CHANGE UP (ref 80–100)
NRBC # BLD: 0 /100 WBCS — SIGNIFICANT CHANGE UP (ref 0–0)
PLATELET # BLD AUTO: 225 K/UL — SIGNIFICANT CHANGE UP (ref 150–400)
POTASSIUM SERPL-MCNC: 4.3 MMOL/L — SIGNIFICANT CHANGE UP (ref 3.5–5.3)
POTASSIUM SERPL-SCNC: 4.3 MMOL/L — SIGNIFICANT CHANGE UP (ref 3.5–5.3)
RBC # BLD: 3.82 M/UL — LOW (ref 4.2–5.8)
RBC # FLD: 13.8 % — SIGNIFICANT CHANGE UP (ref 10.3–14.5)
SODIUM SERPL-SCNC: 136 MMOL/L — SIGNIFICANT CHANGE UP (ref 135–145)
WBC # BLD: 7.52 K/UL — SIGNIFICANT CHANGE UP (ref 3.8–10.5)
WBC # FLD AUTO: 7.52 K/UL — SIGNIFICANT CHANGE UP (ref 3.8–10.5)

## 2024-04-25 PROCEDURE — 99232 SBSQ HOSP IP/OBS MODERATE 35: CPT

## 2024-04-25 PROCEDURE — 71045 X-RAY EXAM CHEST 1 VIEW: CPT | Mod: 26

## 2024-04-25 RX ORDER — MAGNESIUM HYDROXIDE 400 MG/1
30 TABLET, CHEWABLE ORAL DAILY
Refills: 0 | Status: DISCONTINUED | OUTPATIENT
Start: 2024-04-25 | End: 2024-04-26

## 2024-04-25 RX ADMIN — Medication 1000 MILLIGRAM(S): at 21:46

## 2024-04-25 RX ADMIN — PANTOPRAZOLE SODIUM 40 MILLIGRAM(S): 20 TABLET, DELAYED RELEASE ORAL at 06:04

## 2024-04-25 RX ADMIN — OXYCODONE HYDROCHLORIDE 10 MILLIGRAM(S): 5 TABLET ORAL at 21:18

## 2024-04-25 RX ADMIN — OXYCODONE HYDROCHLORIDE 10 MILLIGRAM(S): 5 TABLET ORAL at 02:59

## 2024-04-25 RX ADMIN — Medication 1000 MILLIGRAM(S): at 13:48

## 2024-04-25 RX ADMIN — AMLODIPINE BESYLATE 10 MILLIGRAM(S): 2.5 TABLET ORAL at 06:04

## 2024-04-25 RX ADMIN — SENNA PLUS 2 TABLET(S): 8.6 TABLET ORAL at 21:47

## 2024-04-25 RX ADMIN — OXYCODONE HYDROCHLORIDE 10 MILLIGRAM(S): 5 TABLET ORAL at 03:55

## 2024-04-25 RX ADMIN — OXYCODONE HYDROCHLORIDE 10 MILLIGRAM(S): 5 TABLET ORAL at 13:49

## 2024-04-25 RX ADMIN — BUPROPION HYDROCHLORIDE 300 MILLIGRAM(S): 150 TABLET, EXTENDED RELEASE ORAL at 11:27

## 2024-04-25 RX ADMIN — OXYCODONE HYDROCHLORIDE 10 MILLIGRAM(S): 5 TABLET ORAL at 20:33

## 2024-04-25 RX ADMIN — Medication 50 MILLIGRAM(S): at 11:27

## 2024-04-25 RX ADMIN — OXYCODONE HYDROCHLORIDE 10 MILLIGRAM(S): 5 TABLET ORAL at 14:49

## 2024-04-25 RX ADMIN — POLYETHYLENE GLYCOL 3350 17 GRAM(S): 17 POWDER, FOR SOLUTION ORAL at 11:27

## 2024-04-25 RX ADMIN — POLYETHYLENE GLYCOL 3350 17 GRAM(S): 17 POWDER, FOR SOLUTION ORAL at 06:04

## 2024-04-25 RX ADMIN — Medication 1000 MILLIGRAM(S): at 06:04

## 2024-04-25 NOTE — OCCUPATIONAL THERAPY INITIAL EVALUATION ADULT - PLANNED THERAPY INTERVENTIONS, OT EVAL
ADL retraining/IADL retraining/balance training/bed mobility training/cognitive, visual perceptual/fine motor coordination training/motor coordination training/neuromuscular re-education/ROM/strengthening/stretching/transfer training

## 2024-04-25 NOTE — PROGRESS NOTE ADULT - SUBJECTIVE AND OBJECTIVE BOX
Ortho Note    Subjective:  Pt comfortable without complaints, pain controlled with current pain medication regimen   Denies CP, SOB, N/V, reporting right thing numbness after surgery   Reviewed plan of care with patient at bedside        Vital Signs Last 24 Hrs  T(C): 36.8 (04-25-24 @ 08:50), Max: 36.8 (04-25-24 @ 08:50)  T(F): 98.2 (04-25-24 @ 08:50), Max: 98.2 (04-25-24 @ 08:50)  HR: 76 (04-25-24 @ 09:29) (76 - 87)  BP: 102/66 (04-25-24 @ 08:50) (102/66 - 102/66)  BP(mean): --  RR: 18 (04-25-24 @ 09:29) (16 - 18)  SpO2: 92% (04-25-24 @ 09:29) (92% - 95%)  AVSS    Objective:    Physical Exam:  General: Pt Alert and oriented, NAD  Lumbar DSG C/D/I  Pulses: +2 pedal pulses, wwp toes   Sensation: right thigh numbness otherwise silt intact  Motor: EHL/FHL/TA/GS- 5/5 bilateral lower extremities          Plan of Care:  A/P: 53yMale POD#2 s/p L3-L4 TLIF, laminectomy, facetectomy   - afebrile, wbcs 7.52  - Pain Control- Oxycodone 5-10mg PO Q4h prn moderate to severe pain, Dilaudid 0.5mg Q4h prn breakthrough pain lyrica 50mg PO TID,   - DVT ppx: scds  - PT, WBS: WBAT  - appreciate medicine recs  - ambulate with pt as tolerated  - continue hv x2   - bowel regimen, Is use, PPI   - Dispo- home pending pt clearance and dc of s    Ortho Pager [phone]

## 2024-04-25 NOTE — OCCUPATIONAL THERAPY INITIAL EVALUATION ADULT - MODIFIED CLINICAL TEST OF SENSORY INTEGRATION IN BALANCE TEST
Pt able to ambulate 150 feet x2 with CGA using RW, demonstrating good dynamic balance, decreased step length and lisa.

## 2024-04-25 NOTE — PROGRESS NOTE ADULT - SUBJECTIVE AND OBJECTIVE BOX
INTERVAL HPI/OVERNIGHT EVENTS: mery o/n    SUBJECTIVE: Patient seen and examined at bedside.   Ambulating using walker. States pain is controlled. Denies LH/dizziness, chest pain, dyspnea. Voiding. Eating wo N/V/Abd pain. No fevers    OBJECTIVE:    VITAL SIGNS:  ICU Vital Signs Last 24 Hrs  T(C): 36.8 (25 Apr 2024 13:35), Max: 37.3 (24 Apr 2024 20:53)  T(F): 98.2 (25 Apr 2024 13:35), Max: 99.1 (24 Apr 2024 20:53)  HR: 87 (25 Apr 2024 13:35) (69 - 87)  BP: 105/67 (25 Apr 2024 13:35) (102/66 - 125/65)  BP(mean): --  ABP: --  ABP(mean): --  RR: 17 (25 Apr 2024 13:35) (16 - 19)  SpO2: 95% (25 Apr 2024 13:35) (92% - 97%)    O2 Parameters below as of 25 Apr 2024 13:35  Patient On (Oxygen Delivery Method): room air              04-24 @ 07:01 - 04-25 @ 07:00  --------------------------------------------------------  IN: 840 mL / OUT: 2600 mL / NET: -1760 mL    04-25 @ 07:01 - 04-25 @ 14:32  --------------------------------------------------------  IN: 340 mL / OUT: 260 mL / NET: 80 mL      CAPILLARY BLOOD GLUCOSE          PHYSICAL EXAM:  GEN: Male in NAD on RA  HEENT: NC/AT, MMM  CV: RRR, nml S1S2, no murmurs  PULM: nml effort, CTAB  ABD: Soft, non-distended, NABS, non-tender  NEURO  A/O x3, moving all extremities, Sensation intact  Lumbar dressing c/d/i. 5/5 in BLE. Drain present  PSYCH: Appropriate      MEDICATIONS:  MEDICATIONS  (STANDING):  acetaminophen     Tablet .. 1000 milliGRAM(s) Oral every 8 hours  amLODIPine   Tablet 10 milliGRAM(s) Oral daily  buPROPion XL (24-Hour) . 300 milliGRAM(s) Oral daily  pantoprazole    Tablet 40 milliGRAM(s) Oral before breakfast  polyethylene glycol 3350 17 Gram(s) Oral two times a day  pregabalin 50 milliGRAM(s) Oral daily  senna 2 Tablet(s) Oral at bedtime    MEDICATIONS  (PRN):  HYDROmorphone  Injectable 0.5 milliGRAM(s) IV Push every 15 minutes PRN breakthrough pain in the PACU  HYDROmorphone  Injectable 0.5 milliGRAM(s) IV Push every 4 hours PRN breakthrough pain  ondansetron Injectable 4 milliGRAM(s) IV Push every 4 hours PRN Nausea and/or Vomiting  oxyCODONE    IR 5 milliGRAM(s) Oral every 4 hours PRN Moderate Pain (4 - 6)  oxyCODONE    IR 10 milliGRAM(s) Oral every 4 hours PRN Severe Pain (7 - 10)      ALLERGIES:  Allergies    No Known Allergies    Intolerances        LABS:                        11.2   7.52  )-----------( 225      ( 25 Apr 2024 05:30 )             34.9     04-25    136  |  101  |  13  ----------------------------<  99  4.3   |  29  |  0.97    Ca    9.5      25 Apr 2024 05:30        Urinalysis Basic - ( 25 Apr 2024 05:30 )    Color: x / Appearance: x / SG: x / pH: x  Gluc: 99 mg/dL / Ketone: x  / Bili: x / Urobili: x   Blood: x / Protein: x / Nitrite: x   Leuk Esterase: x / RBC: x / WBC x   Sq Epi: x / Non Sq Epi: x / Bacteria: x        RADIOLOGY & ADDITIONAL TESTS: Reviewed.

## 2024-04-25 NOTE — OCCUPATIONAL THERAPY INITIAL EVALUATION ADULT - GENERAL OBSERVATIONS, REHAB EVAL
Pt received semi-supine in bed. +heplock, +tele, +hemovac x2, +back dressing C/D/I, NAD, and agreeable to OT. Cleared by KANE Chisholm to see.

## 2024-04-25 NOTE — OCCUPATIONAL THERAPY INITIAL EVALUATION ADULT - DIAGNOSIS, OT EVAL
Pt admitted POD3 (4/22/2024) for laminectomy, facetectomy, L3-4 TLIF and presents with impaired balance, decreased strength and activity tolerance.

## 2024-04-25 NOTE — PROGRESS NOTE ADULT - SUBJECTIVE AND OBJECTIVE BOX
Ortho Note    Pt comfortable without complaints, pain controlled, ambulated through unit yesterday  Denies CP, SOB, N/V    Vital Signs Last 24 Hrs  T(C): 36.7 (04-25-24 @ 04:35), Max: 36.7 (04-25-24 @ 04:35)  T(F): 98.1 (04-25-24 @ 04:35), Max: 98.1 (04-25-24 @ 04:35)  HR: 78 (04-25-24 @ 06:00) (77 - 87)  BP: 116/75 (04-25-24 @ 06:00) (108/77 - 116/75)  BP(mean): --  RR: 18 (04-25-24 @ 05:40) (18 - 18)  SpO2: 93% (04-25-24 @ 05:40) (93% - 97%)  I&O's Summary    23 Apr 2024 07:01  -  24 Apr 2024 07:00  --------------------------------------------------------  IN: 1540 mL / OUT: 2440 mL / NET: -900 mL    24 Apr 2024 07:01  -  25 Apr 2024 06:58  --------------------------------------------------------  IN: 840 mL / OUT: 2600 mL / NET: -1760 mL      General: Pt Alert and oriented, NAD  Gauze paper tape DSG C/D/I  HV x2  Pulses: 2+ DP/PT b/l  Sensation: SILT b/l, pre-op thigh numbness recurring but still less than pre-op level  Motor: Quad/Ham/EHL/FHL/TA/GS  5/5                          11.2   7.52  )-----------( 225      ( 25 Apr 2024 05:30 )             34.9     04-25    136  |  101  |  13  ----------------------------<  99  4.3   |  29  |  0.97    Ca    9.5      25 Apr 2024 05:30        A/P: 53yMale POD#3 s/p L3-4 TLIF  - Stable  - Pain Control  - DVT ppx: SCDs  - PT, WBS: WBAT  - f/u drain output  - Dispo: HPT     Ortho Pager [phone]

## 2024-04-25 NOTE — OCCUPATIONAL THERAPY INITIAL EVALUATION ADULT - SENSORY TESTS
Regarding light touch sensation, pt reports 90% of sensation in R thigh and calf area, compared to L side at 100%.

## 2024-04-25 NOTE — OCCUPATIONAL THERAPY INITIAL EVALUATION ADULT - ADDITIONAL COMMENTS
Pt states he lives with his wife and children in a private house with 4-5 HELEN no handrail. Pt reports being independent with ADLs, IADLs. and mobility tasks, use of no AD. Recently, 2/2 LE pain pt has been using an axillary crutch. Pt has a tub in bathroom. Pt mentioned his wife ordered him a shower chair and commode.       reports he lives in private home with 5 HELEN. PTA patient ambulated with 1 axillary crutch. Patient has tub shower

## 2024-04-26 VITALS
DIASTOLIC BLOOD PRESSURE: 77 MMHG | SYSTOLIC BLOOD PRESSURE: 115 MMHG | OXYGEN SATURATION: 91 % | TEMPERATURE: 98 F | RESPIRATION RATE: 18 BRPM | HEART RATE: 85 BPM

## 2024-04-26 LAB
ANION GAP SERPL CALC-SCNC: 7 MMOL/L — SIGNIFICANT CHANGE UP (ref 5–17)
BUN SERPL-MCNC: 12 MG/DL — SIGNIFICANT CHANGE UP (ref 7–23)
CALCIUM SERPL-MCNC: 9.1 MG/DL — SIGNIFICANT CHANGE UP (ref 8.4–10.5)
CHLORIDE SERPL-SCNC: 101 MMOL/L — SIGNIFICANT CHANGE UP (ref 96–108)
CO2 SERPL-SCNC: 27 MMOL/L — SIGNIFICANT CHANGE UP (ref 22–31)
CREAT SERPL-MCNC: 0.86 MG/DL — SIGNIFICANT CHANGE UP (ref 0.5–1.3)
EGFR: 105 ML/MIN/1.73M2 — SIGNIFICANT CHANGE UP
GLUCOSE SERPL-MCNC: 98 MG/DL — SIGNIFICANT CHANGE UP (ref 70–99)
HCT VFR BLD CALC: 35.2 % — LOW (ref 39–50)
HGB BLD-MCNC: 11.1 G/DL — LOW (ref 13–17)
MCHC RBC-ENTMCNC: 29.1 PG — SIGNIFICANT CHANGE UP (ref 27–34)
MCHC RBC-ENTMCNC: 31.5 GM/DL — LOW (ref 32–36)
MCV RBC AUTO: 92.1 FL — SIGNIFICANT CHANGE UP (ref 80–100)
NRBC # BLD: 0 /100 WBCS — SIGNIFICANT CHANGE UP (ref 0–0)
PLATELET # BLD AUTO: 225 K/UL — SIGNIFICANT CHANGE UP (ref 150–400)
POTASSIUM SERPL-MCNC: 4.4 MMOL/L — SIGNIFICANT CHANGE UP (ref 3.5–5.3)
POTASSIUM SERPL-SCNC: 4.4 MMOL/L — SIGNIFICANT CHANGE UP (ref 3.5–5.3)
RBC # BLD: 3.82 M/UL — LOW (ref 4.2–5.8)
RBC # FLD: 13.2 % — SIGNIFICANT CHANGE UP (ref 10.3–14.5)
SODIUM SERPL-SCNC: 135 MMOL/L — SIGNIFICANT CHANGE UP (ref 135–145)
WBC # BLD: 6.26 K/UL — SIGNIFICANT CHANGE UP (ref 3.8–10.5)
WBC # FLD AUTO: 6.26 K/UL — SIGNIFICANT CHANGE UP (ref 3.8–10.5)

## 2024-04-26 PROCEDURE — 97161 PT EVAL LOW COMPLEX 20 MIN: CPT

## 2024-04-26 PROCEDURE — 85027 COMPLETE CBC AUTOMATED: CPT

## 2024-04-26 PROCEDURE — 36415 COLL VENOUS BLD VENIPUNCTURE: CPT

## 2024-04-26 PROCEDURE — C1713: CPT

## 2024-04-26 PROCEDURE — 99232 SBSQ HOSP IP/OBS MODERATE 35: CPT

## 2024-04-26 PROCEDURE — C1889: CPT

## 2024-04-26 PROCEDURE — 86850 RBC ANTIBODY SCREEN: CPT

## 2024-04-26 PROCEDURE — 97530 THERAPEUTIC ACTIVITIES: CPT

## 2024-04-26 PROCEDURE — 86900 BLOOD TYPING SEROLOGIC ABO: CPT

## 2024-04-26 PROCEDURE — 85025 COMPLETE CBC W/AUTO DIFF WBC: CPT

## 2024-04-26 PROCEDURE — 97116 GAIT TRAINING THERAPY: CPT

## 2024-04-26 PROCEDURE — 86901 BLOOD TYPING SEROLOGIC RH(D): CPT

## 2024-04-26 PROCEDURE — 71045 X-RAY EXAM CHEST 1 VIEW: CPT

## 2024-04-26 PROCEDURE — P9045: CPT

## 2024-04-26 PROCEDURE — 94660 CPAP INITIATION&MGMT: CPT

## 2024-04-26 PROCEDURE — 76000 FLUOROSCOPY <1 HR PHYS/QHP: CPT

## 2024-04-26 PROCEDURE — 97165 OT EVAL LOW COMPLEX 30 MIN: CPT

## 2024-04-26 PROCEDURE — 80048 BASIC METABOLIC PNL TOTAL CA: CPT

## 2024-04-26 RX ORDER — SENNA PLUS 8.6 MG/1
2 TABLET ORAL
Qty: 0 | Refills: 0 | DISCHARGE
Start: 2024-04-26

## 2024-04-26 RX ORDER — BUPROPION HYDROCHLORIDE 150 MG/1
1 TABLET, EXTENDED RELEASE ORAL
Refills: 0 | DISCHARGE

## 2024-04-26 RX ORDER — BUPROPION HYDROCHLORIDE 150 MG/1
1 TABLET, EXTENDED RELEASE ORAL
Qty: 0 | Refills: 0 | DISCHARGE
Start: 2024-04-26

## 2024-04-26 RX ORDER — PANTOPRAZOLE SODIUM 20 MG/1
1 TABLET, DELAYED RELEASE ORAL
Qty: 30 | Refills: 0
Start: 2024-04-26 | End: 2024-05-25

## 2024-04-26 RX ORDER — GABAPENTIN 400 MG/1
1 CAPSULE ORAL
Refills: 0 | DISCHARGE

## 2024-04-26 RX ORDER — OXYCODONE HYDROCHLORIDE 5 MG/1
1 TABLET ORAL
Qty: 28 | Refills: 0
Start: 2024-04-26 | End: 2024-05-02

## 2024-04-26 RX ORDER — ACETAMINOPHEN 500 MG
2 TABLET ORAL
Qty: 0 | Refills: 0 | DISCHARGE
Start: 2024-04-26

## 2024-04-26 RX ADMIN — OXYCODONE HYDROCHLORIDE 10 MILLIGRAM(S): 5 TABLET ORAL at 15:09

## 2024-04-26 RX ADMIN — Medication 1000 MILLIGRAM(S): at 06:40

## 2024-04-26 RX ADMIN — POLYETHYLENE GLYCOL 3350 17 GRAM(S): 17 POWDER, FOR SOLUTION ORAL at 05:55

## 2024-04-26 RX ADMIN — BUPROPION HYDROCHLORIDE 300 MILLIGRAM(S): 150 TABLET, EXTENDED RELEASE ORAL at 11:41

## 2024-04-26 RX ADMIN — Medication 1000 MILLIGRAM(S): at 05:56

## 2024-04-26 RX ADMIN — Medication 1000 MILLIGRAM(S): at 14:10

## 2024-04-26 RX ADMIN — OXYCODONE HYDROCHLORIDE 5 MILLIGRAM(S): 5 TABLET ORAL at 05:55

## 2024-04-26 RX ADMIN — PANTOPRAZOLE SODIUM 40 MILLIGRAM(S): 20 TABLET, DELAYED RELEASE ORAL at 06:02

## 2024-04-26 RX ADMIN — OXYCODONE HYDROCHLORIDE 10 MILLIGRAM(S): 5 TABLET ORAL at 14:09

## 2024-04-26 RX ADMIN — Medication 50 MILLIGRAM(S): at 11:41

## 2024-04-26 RX ADMIN — OXYCODONE HYDROCHLORIDE 5 MILLIGRAM(S): 5 TABLET ORAL at 06:40

## 2024-04-26 NOTE — DISCHARGE NOTE NURSING/CASE MANAGEMENT/SOCIAL WORK - PATIENT PORTAL LINK FT
You can access the FollowMyHealth Patient Portal offered by [facility] by registering at the following website: [email or web address]. By joining [facility]’s FollowMyHealth portal, you will also be able to view your health information using other applications (apps) compatible with our system.

## 2024-04-26 NOTE — PROGRESS NOTE ADULT - REASON FOR ADMISSION
Low back pain

## 2024-04-26 NOTE — PROGRESS NOTE ADULT - SUBJECTIVE AND OBJECTIVE BOX
INTERVAL HPI/OVERNIGHT EVENTS: mery o/n    SUBJECTIVE: Patient seen and examined at bedside.   Seated in chair - states pain was 5/10 but manageable. No numbness. Drains remain in place. Had BM. No N/V/Abd pain, chest pain, dyspnea, fevers.3    OBJECTIVE:    VITAL SIGNS:  ICU Vital Signs Last 24 Hrs  T(C): 36.7 (26 Apr 2024 13:09), Max: 36.9 (26 Apr 2024 00:18)  T(F): 98 (26 Apr 2024 13:09), Max: 98.4 (26 Apr 2024 00:18)  HR: 85 (26 Apr 2024 13:09) (73 - 88)  BP: 115/77 (26 Apr 2024 13:09) (108/62 - 119/64)  BP(mean): 84 (26 Apr 2024 00:18) (84 - 84)  ABP: --  ABP(mean): --  RR: 18 (26 Apr 2024 13:09) (12 - 18)  SpO2: 91% (26 Apr 2024 13:09) (91% - 98%)    O2 Parameters below as of 26 Apr 2024 09:41  Patient On (Oxygen Delivery Method): room air              04-25 @ 07:01  -  04-26 @ 07:00  --------------------------------------------------------  IN: 820 mL / OUT: 1987 mL / NET: -1167 mL      CAPILLARY BLOOD GLUCOSE          PHYSICAL EXAM:  GEN: Male in NAD on RA  HEENT: NC/AT, MMM  CV: RRR, nml S1S2, no murmurs  PULM: nml effort, CTAB  ABD: Soft, non-distended, NABS, non-tender  NEURO  A/O x3, moving all extremities, Sensation intact  Lumbar dressing c/d/i. 5/5 in BLE. Drain present  PSYCH: Appropriate    MEDICATIONS:  MEDICATIONS  (STANDING):  acetaminophen     Tablet .. 1000 milliGRAM(s) Oral every 8 hours  amLODIPine   Tablet 10 milliGRAM(s) Oral daily  buPROPion XL (24-Hour) . 300 milliGRAM(s) Oral daily  pantoprazole    Tablet 40 milliGRAM(s) Oral before breakfast  polyethylene glycol 3350 17 Gram(s) Oral two times a day  pregabalin 50 milliGRAM(s) Oral daily  senna 2 Tablet(s) Oral at bedtime    MEDICATIONS  (PRN):  HYDROmorphone  Injectable 0.5 milliGRAM(s) IV Push every 15 minutes PRN breakthrough pain in the PACU  HYDROmorphone  Injectable 0.5 milliGRAM(s) IV Push every 4 hours PRN breakthrough pain  magnesium hydroxide Suspension 30 milliLiter(s) Oral daily PRN Constipation  ondansetron Injectable 4 milliGRAM(s) IV Push every 4 hours PRN Nausea and/or Vomiting  oxyCODONE    IR 5 milliGRAM(s) Oral every 4 hours PRN Moderate Pain (4 - 6)  oxyCODONE    IR 10 milliGRAM(s) Oral every 4 hours PRN Severe Pain (7 - 10)      ALLERGIES:  Allergies    No Known Allergies    Intolerances        LABS:                        11.1   6.26  )-----------( 225      ( 26 Apr 2024 06:37 )             35.2     04-26    135  |  101  |  12  ----------------------------<  98  4.4   |  27  |  0.86    Ca    9.1      26 Apr 2024 06:37        Urinalysis Basic - ( 26 Apr 2024 06:37 )    Color: x / Appearance: x / SG: x / pH: x  Gluc: 98 mg/dL / Ketone: x  / Bili: x / Urobili: x   Blood: x / Protein: x / Nitrite: x   Leuk Esterase: x / RBC: x / WBC x   Sq Epi: x / Non Sq Epi: x / Bacteria: x        RADIOLOGY & ADDITIONAL TESTS: Reviewed.

## 2024-04-26 NOTE — PROGRESS NOTE ADULT - SUBJECTIVE AND OBJECTIVE BOX
Ortho Note    Pt comfortable without complaints, pain controlled  Denies CP, SOB, N/V, numbness/tingling     Vital Signs Last 24 Hrs  T(C): 36.4 (04-26-24 @ 05:49), Max: 36.4 (04-26-24 @ 05:49)  T(F): 97.5 (04-26-24 @ 05:49), Max: 97.5 (04-26-24 @ 05:49)  HR: 73 (04-26-24 @ 05:49) (73 - 73)  BP: 108/68 (04-26-24 @ 05:49) (108/68 - 108/68)  BP(mean): --  RR: 16 (04-26-24 @ 05:49) (16 - 16)  SpO2: 93% (04-26-24 @ 05:49) (93% - 93%)  I&O's Summary    25 Apr 2024 07:01  -  26 Apr 2024 07:00  --------------------------------------------------------  IN: 820 mL / OUT: 1987 mL / NET: -1167 mL        General: Pt Alert and oriented, NAD  DSG C/D/I  Pulses: 2+ DP/PT b/l  Sensation: SILT b/l  Motor: Quad/Ham/EHL/FHL/TA/GS  5/5                          11.1   6.26  )-----------( 225      ( 26 Apr 2024 06:37 )             35.2     04-26    135  |  101  |  12  ----------------------------<  98  4.4   |  27  |  0.86    Ca    9.1      26 Apr 2024 06:37        A/P: 53yMale POD#4 s/p L3-4 TLIF  - Stable  - Pain Control  - DVT ppx: SCDs  - PT, WBS: WBAT  - f/u drain output  - Dispo: HPT    Ortho Pager [phone]

## 2024-04-26 NOTE — PROGRESS NOTE ADULT - ASSESSMENT
53M w HTN, Depression, obesity, chronic low back pain, Tobacco use d/o, here for elective L3-S1 TLIF and Postero-lateral fusion w Dr. To, found by anesthesia to have high likelihood for AGUILA, for HPT    #Post-op state - pain controlled. PPx: SCDs. On bowel regimen and incentive spirometer  #Lumbar stenosis    - Tylenol 1g q8, lyrica 50 daily   - PRN: Oxycodone 5 q6 for mod pain.     #Acute blood loss anemia - hgb 11.1. Baseline 15. Asymptomatic  #Leukocytosis - Resolved    #HTN - home on amlodipine 10  #Depression - home on buproprion 150 ER BID  #Obesity - BMI 30.8. Affects all aspects of care  #AGUILA - suspected by anesthesia perioperatively. Started on BiPAP HS   - Pt's STOPBANG indicates HIGH likelihood for AGUILA. Pt was told by PCP this as well and notes plan was to get referred to sleep study after surgery   - c/w BiPAP HS  #Tobacco use d/o - motivated to quit    Plan  Continue PT  BiPAP HS  Pt will need outpatient follow-up w PCP and schedule sleep study.    DISPO: HPT - pending clearing PT and drain output  Above d/w Ortho
53M w HTN, Depression, obesity, chronic low back pain, Tobacco use d/o, here for elective L3-S1 TLIF and Postero-lateral fusion w Dr. To, found by anesthesia to have high likelihood for AGUILA, for HPT    #Post-op state - pain controlled. PPx: SCDs. On bowel regimen and incentive spirometer  #Lumbar stenosis    - Tylenol 1g q8, lyrica 50 daily   - PRN: Oxycodone 5 q6 for mod pain.     #Acute blood loss anemia - hgb 10.6 from 11.2. Baseline 15. Asymptomatic  #Leukocytosis - Resolved    #HTN - home on amlodipine 10  #Depression - home on buproprion 150 ER BID  #Obesity - BMI 30.8. Affects all aspects of care  #AGUILA - suspected by anesthesia perioperatively. Started on BiPAP HS   - Pt's STOPBANG indicates HIGH likelihood for AGUILA. Pt was told by PCP this as well and notes plan was to get referred to sleep study after surgery   - c/w BiPAP HS  #Tobacco use d/o - motivated to quit    Plan  Continue PT  BiPAP HS  Pt will need outpatient follow-up w PCP and schedule sleep study.    DISPO: HPT - pending clearing PT and drain output  Above d/w Ortho
53M w HTN, Depression, obesity, chronic low back pain, Tobacco use d/o, here for elective L3-S1 TLIF and Postero-lateral fusion w Dr. To, found by anesthesia to have high likelihood for AGUILA, for HPT    #Post-op state - pain controlled. PPx: SCDs. On bowel regimen and incentive spirometer  #Lumbar stenosis    - Tylenol 1g q8, lyrica 50 daily   - PRN: Oxycodone 5 q6 for mod pain.     #Acute blood loss anemia - hgb 11.2. Baseline 15. Asymptomatic  #Leukocytosis - Resolved    #HTN - home on amlodipine 10  #Depression - home on buproprion 150 ER BID  #Obesity - BMI 30.8. Affects all aspects of care  #AGUILA - suspected by anesthesia perioperatively. Started on BiPAP HS   - Pt's STOPBANG indicates HIGH likelihood for AGUILA. Pt was told by PCP this as well and notes plan was to get referred to sleep study after surgery   - c/w BiPAP HS  #Tobacco use d/o - motivated to quit    Plan  Continue PT  BiPAP HS  Pt will need outpatient follow-up w PCP and schedule sleep study.    DISPO: HPT - pending clearing PT and drain output  Above d/w Ortho

## 2024-04-26 NOTE — PROGRESS NOTE ADULT - SUBJECTIVE AND OBJECTIVE BOX
Ortho Note    Subjective:  Pt comfortable without complaints, pain controlled with current pain medication regimen   Denies CP, SOB, N/V, reporting right thing numbness after surgery , improving since surgery   Reviewed plan of care with patient at bedside    Vital Signs Last 24 Hrs  T(C): 36.7 (04-26-24 @ 13:09), Max: 36.8 (04-26-24 @ 08:39)  T(F): 98 (04-26-24 @ 13:09), Max: 98.2 (04-26-24 @ 08:39)  HR: 85 (04-26-24 @ 13:09) (81 - 88)  BP: 115/77 (04-26-24 @ 13:09) (108/62 - 127/71)  BP(mean): --  RR: 18 (04-26-24 @ 13:09) (18 - 18)  SpO2: 91% (04-26-24 @ 13:09) (91% - 98%)  AVSS    Objective:    Physical Exam:  General: Pt Alert and oriented, NAD  Lumbar DSG C/D/I  Pulses: +2 pedal pulses, wwp toes   Sensation: right thigh numbness otherwise silt intact  Motor: EHL/FHL/TA/GS- 5/5 bilateral lower extremities            Plan of Care:  A/P: 53yMale POD#3 s/p L3-L4 TLIF, laminectomy, facetectomy   - afebrile, wbcs 7.52  - Pain Control- Oxycodone 5-10mg PO Q4h prn moderate to severe pain, Dilaudid 0.5mg Q4h prn breakthrough pain lyrica 50mg PO TID,   - DVT ppx: scds  - PT, WBS: WBAT  - appreciate medicine recs  - ambulate with pt as tolerated  - continue hv x2 dc drains today 4-26  - bowel regimen, Is use, PPI   - Dispo- home pending pt clearance and dc of hvs    Ortho Pager [phone]

## 2024-04-26 NOTE — PROGRESS NOTE ADULT - PROVIDER SPECIALTY LIST ADULT
Orthopedics
Hospitalist
Orthopedics
Orthopedics
Hospitalist
Orthopedics
Hospitalist
<-- Click to add NO significant Past Surgical History

## 2024-05-03 DIAGNOSIS — I10 ESSENTIAL (PRIMARY) HYPERTENSION: ICD-10-CM

## 2024-05-03 DIAGNOSIS — F32.A DEPRESSION, UNSPECIFIED: ICD-10-CM

## 2024-05-03 DIAGNOSIS — Z99.89 DEPENDENCE ON OTHER ENABLING MACHINES AND DEVICES: ICD-10-CM

## 2024-05-03 DIAGNOSIS — F10.90 ALCOHOL USE, UNSPECIFIED, UNCOMPLICATED: ICD-10-CM

## 2024-05-03 DIAGNOSIS — M43.16 SPONDYLOLISTHESIS, LUMBAR REGION: ICD-10-CM

## 2024-05-03 DIAGNOSIS — M48.061 SPINAL STENOSIS, LUMBAR REGION WITHOUT NEUROGENIC CLAUDICATION: ICD-10-CM

## 2024-05-03 DIAGNOSIS — M47.26 OTHER SPONDYLOSIS WITH RADICULOPATHY, LUMBAR REGION: ICD-10-CM

## 2024-05-03 DIAGNOSIS — M71.38 OTHER BURSAL CYST, OTHER SITE: ICD-10-CM

## 2024-05-03 DIAGNOSIS — F17.210 NICOTINE DEPENDENCE, CIGARETTES, UNCOMPLICATED: ICD-10-CM

## 2024-05-03 DIAGNOSIS — F41.9 ANXIETY DISORDER, UNSPECIFIED: ICD-10-CM

## 2024-05-03 DIAGNOSIS — D72.829 ELEVATED WHITE BLOOD CELL COUNT, UNSPECIFIED: ICD-10-CM

## 2024-05-03 DIAGNOSIS — G47.33 OBSTRUCTIVE SLEEP APNEA (ADULT) (PEDIATRIC): ICD-10-CM

## 2024-05-03 DIAGNOSIS — E66.9 OBESITY, UNSPECIFIED: ICD-10-CM

## (undated) DEVICE — PREP CHLOROHEXIDINE 4% 118CC KIT

## (undated) DEVICE — VENODYNE/SCD SLEEVE CALF MEDIUM

## (undated) DEVICE — GLV 9 PROTEXIS (WHITE)

## (undated) DEVICE — WARMING BLANKET UPPER ADULT

## (undated) DEVICE — SUT STRATAFIX SYMMETRIC PDS 1 45CM OS-6

## (undated) DEVICE — DRAPE INSTRUMENT POUCH 6.75" X 11"

## (undated) DEVICE — DRAPE BACK TABLE COVER 80X90"

## (undated) DEVICE — DRAPE 3/4 SHEET 52X76"

## (undated) DEVICE — LAVAGE SIMPLUSE

## (undated) DEVICE — PACK SPINE

## (undated) DEVICE — NDL SPINAL 18G X 3.5" (PINK)

## (undated) DEVICE — SUT VICRYL 2-0 27" CT-1 UNDYED

## (undated) DEVICE — MIDAS REX MR8 BALL FLUTED LG BORE 5MM X 14CM

## (undated) DEVICE — PREP DURAPREP 26CC

## (undated) DEVICE — STAPLER SKIN PROXIMATE

## (undated) DEVICE — SUT VICRYL 2-0 27" SH UNDYED

## (undated) DEVICE — STRYKER BONE MILL BLADE MEDIUM 5.0MM

## (undated) DEVICE — MIDAS REX MR8 MATCH HEAD FLUTED LG BORE 3MM X 14CM

## (undated) DEVICE — DRAPE 1/2 SHEET 40X57"